# Patient Record
Sex: FEMALE | Race: WHITE | NOT HISPANIC OR LATINO | Employment: FULL TIME | URBAN - METROPOLITAN AREA
[De-identification: names, ages, dates, MRNs, and addresses within clinical notes are randomized per-mention and may not be internally consistent; named-entity substitution may affect disease eponyms.]

---

## 2020-09-17 ENCOUNTER — IMMUNIZATION (OUTPATIENT)
Dept: SOCIAL WORK | Facility: CLINIC | Age: 35
End: 2020-09-17

## 2020-09-17 DIAGNOSIS — Z23 NEED FOR VACCINATION: ICD-10-CM

## 2020-09-17 PROCEDURE — 90686 IIV4 VACC NO PRSV 0.5 ML IM: CPT | Performed by: REGISTERED NURSE

## 2020-11-02 ENCOUNTER — APPOINTMENT (OUTPATIENT)
Dept: URGENT CARE | Facility: CLINIC | Age: 35
End: 2020-11-02

## 2020-12-30 ENCOUNTER — TELEPHONE (OUTPATIENT)
Dept: SCHEDULING | Facility: IMAGING CENTER | Age: 35
End: 2020-12-30

## 2020-12-30 ENCOUNTER — OFFICE VISIT (OUTPATIENT)
Dept: URGENT CARE | Facility: CLINIC | Age: 35
End: 2020-12-30
Payer: COMMERCIAL

## 2020-12-30 VITALS
OXYGEN SATURATION: 97 % | HEIGHT: 62 IN | HEART RATE: 79 BPM | DIASTOLIC BLOOD PRESSURE: 114 MMHG | TEMPERATURE: 96.8 F | SYSTOLIC BLOOD PRESSURE: 172 MMHG | WEIGHT: 182 LBS | BODY MASS INDEX: 33.49 KG/M2

## 2020-12-30 DIAGNOSIS — R42 DIZZY: ICD-10-CM

## 2020-12-30 DIAGNOSIS — I10 HYPERTENSION, UNSPECIFIED TYPE: ICD-10-CM

## 2020-12-30 PROCEDURE — 99204 OFFICE O/P NEW MOD 45 MIN: CPT | Performed by: NURSE PRACTITIONER

## 2020-12-30 RX ORDER — LISINOPRIL 5 MG/1
5 TABLET ORAL DAILY
Qty: 30 TAB | Refills: 0 | Status: SHIPPED | OUTPATIENT
Start: 2020-12-30 | End: 2021-01-11 | Stop reason: SDUPTHER

## 2020-12-30 ASSESSMENT — ENCOUNTER SYMPTOMS
HEADACHES: 1
DIZZINESS: 1
VOMITING: 0

## 2020-12-30 NOTE — PROGRESS NOTES
"  Subjective:     Martha Hobbs is a 35 y.o. female who presents for Blood Pressure Problem (headaches, lightheadedness, dizziness, feels like she is about to faint, q7gsium, has had this in the past)      Lightheaded, with walking. Pounding headache, intermittent. Bilateral pain right behind eyes. Blurred vision with headache. Nausea. No syncope. No SOB or CP. Started at [id number]. Pain greater behind right eye. BP ranges from 150-160 at home. Seen by PCP 2 years ago and told she should start HTN medications. Did not want to go on medications at that time. Had similar symptoms at the time, not as bad. Now has new insurance. Has a PCP her family uses. Does not have an upcoming PCP appt, needs to make an appointment. Hx of migraines in teens, \"not in a long time\".          Dizziness  This is a new problem. The current episode started 1 to 4 weeks ago. The problem occurs intermittently. The problem has been waxing and waning. Associated symptoms include headaches. Pertinent negatives include no chest pain, fever, sore throat, visual change, vomiting or weakness. Nothing aggravates the symptoms. She has tried nothing for the symptoms.       Past Medical History:   Diagnosis Date   • Psychiatric disorder        History reviewed. No pertinent surgical history.    Social History     Socioeconomic History   • Marital status: Single     Spouse name: Not on file   • Number of children: Not on file   • Years of education: Not on file   • Highest education level: Not on file   Occupational History   • Not on file   Social Needs   • Financial resource strain: Not on file   • Food insecurity     Worry: Not on file     Inability: Not on file   • Transportation needs     Medical: Not on file     Non-medical: Not on file   Tobacco Use   • Smoking status: Current Every Day Smoker   Substance and Sexual Activity   • Alcohol use: No   • Drug use: No   • Sexual activity: Not on file   Lifestyle   • Physical activity     Days per week: " "Not on file     Minutes per session: Not on file   • Stress: Not on file   Relationships   • Social connections     Talks on phone: Not on file     Gets together: Not on file     Attends Gnosticist service: Not on file     Active member of club or organization: Not on file     Attends meetings of clubs or organizations: Not on file     Relationship status: Not on file   • Intimate partner violence     Fear of current or ex partner: Not on file     Emotionally abused: Not on file     Physically abused: Not on file     Forced sexual activity: Not on file   Other Topics Concern   • Not on file   Social History Narrative   • Not on file        History reviewed. No pertinent family history.     No Known Allergies    Review of Systems   Constitutional: Negative for fever.   HENT: Negative for ear pain, hearing loss, sore throat and tinnitus.    Eyes: Negative for double vision.   Respiratory: Negative for shortness of breath.    Cardiovascular: Negative for chest pain and leg swelling.   Gastrointestinal: Negative for vomiting.   Neurological: Positive for dizziness and headaches. Negative for sensory change, speech change, focal weakness and weakness.   All other systems reviewed and are negative.       Objective:   BP (!) 172/114   Pulse 79   Temp 36 °C (96.8 °F) (Temporal)   Ht 1.575 m (5' 2\")   Wt 82.6 kg (182 lb)   SpO2 97%   BMI 33.29 kg/m²     Physical Exam  Vitals signs reviewed.   Constitutional:       General: She is not in acute distress.     Appearance: She is well-developed. She is not toxic-appearing.   HENT:      Head: Normocephalic and atraumatic.      Right Ear: Tympanic membrane, ear canal and external ear normal.      Left Ear: Tympanic membrane, ear canal and external ear normal.      Nose: Nose normal.      Mouth/Throat:      Mouth: Mucous membranes are moist.   Eyes:      General: Lids are normal.      Extraocular Movements: Extraocular movements intact.      Conjunctiva/sclera: Conjunctivae " normal.      Pupils: Pupils are equal, round, and reactive to light.      Funduscopic exam:     Right eye: No papilledema.         Left eye: No papilledema.      Comments: Clear retinal margins.    Neck:      Musculoskeletal: Normal range of motion and neck supple. No neck rigidity.   Cardiovascular:      Rate and Rhythm: Normal rate and regular rhythm.      Comments: No ataxia. No papiledema.  Pulmonary:      Effort: Pulmonary effort is normal. No respiratory distress.      Breath sounds: Normal breath sounds. No stridor. No wheezing, rhonchi or rales.   Musculoskeletal: Normal range of motion.   Skin:     General: Skin is warm and dry.      Findings: No rash.   Neurological:      General: No focal deficit present.      Mental Status: She is alert and oriented to person, place, and time.      GCS: GCS eye subscore is 4. GCS verbal subscore is 5. GCS motor subscore is 6.      Cranial Nerves: No facial asymmetry.      Motor: Motor function is intact.      Coordination: Coordination is intact. Romberg sign negative. Heel to Real Test normal.      Gait: Gait is intact. Gait normal.      Comments: Clear speech. No facial droop.    Psychiatric:         Mood and Affect: Mood normal.         Speech: Speech normal.         Behavior: Behavior normal.         Thought Content: Thought content normal.         Judgment: Judgment normal.         Assessment/Plan:   1. Hypertension, unspecified type  - EKG - Clinic Performed  - Comp Metabolic Panel; Future  - TSH; Future  - CBC WITH DIFFERENTIAL; Future  - LIPID PANEL  - lisinopril (PRINIVIL) 5 MG Tab; Take 1 Tab by mouth every day for 30 days.  Dispense: 30 Tab; Refill: 0    2. Dizzy  - EKG - Clinic Performed  -NSR. No ST elevation.     -Lifestyle modifications: Low sodium diet and exercise.  -Blood pressure log.   -Tylenol or NSAIDs for headache.  -Oral hydration.  -ER precautions: worsening symptoms, return of vision changes, severe or persistent  headache, loss of balance,  increased or worsening dizziness, syncope, chest pain, SOB, weakness, neuro changes.    Follow up with PCP. Follow up for new or persistent symptoms, or for any other concerns. Hx of tubal ligation, no intent on pregnancy.Discussed initiating labs, ER precautions, and PCP follow up. Possible need to titrate or change hypertension medication. No neuro deficits. Discussed risk for vascular and organ injury with uncontrolled hypertension.    Differential diagnosis, natural history, supportive care, and indications for immediate follow-up discussed.

## 2021-01-03 ASSESSMENT — ENCOUNTER SYMPTOMS
SHORTNESS OF BREATH: 0
SORE THROAT: 0
FOCAL WEAKNESS: 0
WEAKNESS: 0
SENSORY CHANGE: 0
DOUBLE VISION: 0
FEVER: 0
VISUAL CHANGE: 0
SPEECH CHANGE: 0

## 2021-01-09 ENCOUNTER — HOSPITAL ENCOUNTER (OUTPATIENT)
Dept: LAB | Facility: MEDICAL CENTER | Age: 36
End: 2021-01-09
Attending: NURSE PRACTITIONER
Payer: COMMERCIAL

## 2021-01-09 DIAGNOSIS — I10 HYPERTENSION, UNSPECIFIED TYPE: ICD-10-CM

## 2021-01-09 LAB
ALBUMIN SERPL BCP-MCNC: 4.8 G/DL (ref 3.2–4.9)
ALBUMIN/GLOB SERPL: 1.7 G/DL
ALP SERPL-CCNC: 64 U/L (ref 30–99)
ALT SERPL-CCNC: 18 U/L (ref 2–50)
ANION GAP SERPL CALC-SCNC: 9 MMOL/L (ref 7–16)
AST SERPL-CCNC: 20 U/L (ref 12–45)
BASOPHILS # BLD AUTO: 0.6 % (ref 0–1.8)
BASOPHILS # BLD: 0.04 K/UL (ref 0–0.12)
BILIRUB SERPL-MCNC: 0.4 MG/DL (ref 0.1–1.5)
BUN SERPL-MCNC: 13 MG/DL (ref 8–22)
CALCIUM SERPL-MCNC: 9.7 MG/DL (ref 8.5–10.5)
CHLORIDE SERPL-SCNC: 102 MMOL/L (ref 96–112)
CHOLEST SERPL-MCNC: 213 MG/DL (ref 100–199)
CO2 SERPL-SCNC: 24 MMOL/L (ref 20–33)
CREAT SERPL-MCNC: 0.55 MG/DL (ref 0.5–1.4)
EOSINOPHIL # BLD AUTO: 0.1 K/UL (ref 0–0.51)
EOSINOPHIL NFR BLD: 1.4 % (ref 0–6.9)
ERYTHROCYTE [DISTWIDTH] IN BLOOD BY AUTOMATED COUNT: 51.5 FL (ref 35.9–50)
FASTING STATUS PATIENT QL REPORTED: NORMAL
GLOBULIN SER CALC-MCNC: 2.9 G/DL (ref 1.9–3.5)
GLUCOSE SERPL-MCNC: 94 MG/DL (ref 65–99)
HCT VFR BLD AUTO: 47.1 % (ref 37–47)
HDLC SERPL-MCNC: 70 MG/DL
HGB BLD-MCNC: 15.8 G/DL (ref 12–16)
IMM GRANULOCYTES # BLD AUTO: 0.01 K/UL (ref 0–0.11)
IMM GRANULOCYTES NFR BLD AUTO: 0.1 % (ref 0–0.9)
LDLC SERPL CALC-MCNC: 107 MG/DL
LYMPHOCYTES # BLD AUTO: 1.65 K/UL (ref 1–4.8)
LYMPHOCYTES NFR BLD: 23.7 % (ref 22–41)
MCH RBC QN AUTO: 34.9 PG (ref 27–33)
MCHC RBC AUTO-ENTMCNC: 33.5 G/DL (ref 33.6–35)
MCV RBC AUTO: 104 FL (ref 81.4–97.8)
MONOCYTES # BLD AUTO: 0.55 K/UL (ref 0–0.85)
MONOCYTES NFR BLD AUTO: 7.9 % (ref 0–13.4)
NEUTROPHILS # BLD AUTO: 4.62 K/UL (ref 2–7.15)
NEUTROPHILS NFR BLD: 66.3 % (ref 44–72)
NRBC # BLD AUTO: 0 K/UL
NRBC BLD-RTO: 0 /100 WBC
PLATELET # BLD AUTO: 197 K/UL (ref 164–446)
PMV BLD AUTO: 10 FL (ref 9–12.9)
POTASSIUM SERPL-SCNC: 4.2 MMOL/L (ref 3.6–5.5)
PROT SERPL-MCNC: 7.7 G/DL (ref 6–8.2)
RBC # BLD AUTO: 4.53 M/UL (ref 4.2–5.4)
SODIUM SERPL-SCNC: 135 MMOL/L (ref 135–145)
TRIGL SERPL-MCNC: 179 MG/DL (ref 0–149)
TSH SERPL DL<=0.005 MIU/L-ACNC: 1.57 UIU/ML (ref 0.38–5.33)
WBC # BLD AUTO: 7 K/UL (ref 4.8–10.8)

## 2021-01-09 PROCEDURE — 36415 COLL VENOUS BLD VENIPUNCTURE: CPT

## 2021-01-09 PROCEDURE — 80053 COMPREHEN METABOLIC PANEL: CPT

## 2021-01-09 PROCEDURE — 80061 LIPID PANEL: CPT

## 2021-01-09 PROCEDURE — 85025 COMPLETE CBC W/AUTO DIFF WBC: CPT

## 2021-01-09 PROCEDURE — 84443 ASSAY THYROID STIM HORMONE: CPT

## 2021-01-11 ENCOUNTER — OFFICE VISIT (OUTPATIENT)
Dept: MEDICAL GROUP | Facility: MEDICAL CENTER | Age: 36
End: 2021-01-11
Payer: COMMERCIAL

## 2021-01-11 VITALS
BODY MASS INDEX: 34.41 KG/M2 | OXYGEN SATURATION: 98 % | HEART RATE: 66 BPM | SYSTOLIC BLOOD PRESSURE: 150 MMHG | DIASTOLIC BLOOD PRESSURE: 100 MMHG | HEIGHT: 62 IN | TEMPERATURE: 97.6 F | WEIGHT: 187 LBS

## 2021-01-11 DIAGNOSIS — R71.8 ELEVATED MCV: ICD-10-CM

## 2021-01-11 DIAGNOSIS — I10 HYPERTENSION, UNSPECIFIED TYPE: ICD-10-CM

## 2021-01-11 DIAGNOSIS — E66.09 CLASS 1 OBESITY DUE TO EXCESS CALORIES WITHOUT SERIOUS COMORBIDITY WITH BODY MASS INDEX (BMI) OF 34.0 TO 34.9 IN ADULT: ICD-10-CM

## 2021-01-11 DIAGNOSIS — I10 ESSENTIAL HYPERTENSION: ICD-10-CM

## 2021-01-11 DIAGNOSIS — F31.9 BIPOLAR DEPRESSION (HCC): ICD-10-CM

## 2021-01-11 PROBLEM — E66.811 CLASS 1 OBESITY DUE TO EXCESS CALORIES WITHOUT SERIOUS COMORBIDITY WITH BODY MASS INDEX (BMI) OF 34.0 TO 34.9 IN ADULT: Status: ACTIVE | Noted: 2021-01-11

## 2021-01-11 PROCEDURE — 99214 OFFICE O/P EST MOD 30 MIN: CPT | Performed by: FAMILY MEDICINE

## 2021-01-11 RX ORDER — LISINOPRIL 20 MG/1
20 TABLET ORAL DAILY
Qty: 30 TAB | Refills: 0 | Status: SHIPPED | OUTPATIENT
Start: 2021-01-11 | End: 2021-02-04

## 2021-01-11 ASSESSMENT — PATIENT HEALTH QUESTIONNAIRE - PHQ9: CLINICAL INTERPRETATION OF PHQ2 SCORE: 0

## 2021-01-11 ASSESSMENT — FIBROSIS 4 INDEX: FIB4 SCORE: 0.84

## 2021-01-12 NOTE — ASSESSMENT & PLAN NOTE
New to discuss.   For many years, had BP around 150 systolic  Recently seen in UC, BP severely elevated, patient symptomatic.   Now on lisinoril 5mg., BP improved. Still elevated.   Symptoms improved significantly but still with headaches and dizziness.

## 2021-01-12 NOTE — PROGRESS NOTES
Subjective:     CC: Diagnoses of Hypertension, unspecified type, Class 1 obesity due to excess calories without serious comorbidity with body mass index (BMI) of 34.0 to 34.9 in adult, Essential hypertension, Bipolar depression (HCC), and Elevated MCV were pertinent to this visit.    HPI: Patient is a 35 y.o. female new patient who presents today to establish care.      Essential hypertension  New to discuss.   Patient states that she has been told she had mildly elevated blood pressure for many years, had BP around 150 systolic for years.  Recently seen in , BP severely elevated, patient symptomatic.   Now on lisinoril 5mg, BP improved. Still elevated.   Symptoms improved significantly but still with headaches and dizziness.     Class 1 obesity due to excess calories without serious comorbidity with body mass index (BMI) of 34.0 to 34.9 in adult  Works quite a bit  Grab and go foods frequently.  Has been increasing wt for the past 2 yrs.     Elevated MCV  New problem.  MCV elevated at 104.  No anemia.  Patient denies significant alcohol intake.     Bipolar depression (HCC)  Chronic problem, diagnosed years ago.  Was well controlled on medications in the past.  Now off of medications, needs psychiatrist here in [place].      Past Medical History:   Diagnosis Date   • Essential hypertension 1/11/2021   • Psychiatric disorder        Social History     Tobacco Use   • Smoking status: Current Every Day Smoker     Types: Cigarettes   • Smokeless tobacco: Never Used   Substance Use Topics   • Alcohol use: No   • Drug use: No       Current Outpatient Medications Ordered in Epic   Medication Sig Dispense Refill   • lisinopril (PRINIVIL) 20 MG Tab Take 1 Tab by mouth every day for 30 days. 30 Tab 0     No current Epic-ordered facility-administered medications on file.        Allergies:  Patient has no known allergies.    Health Maintenance: Completed    ROS:  Gen: no fevers/chill, no changes in weight  Eyes: no changes in  "vision  ENT: no sore throat, no hearing loss, no bloody nose  Pulm: no sob, no cough  CV: no chest pain, no palpitations  GI: no nausea/vomiting, no diarrhea  : no dysuria  MSk: no myalgias  Skin: no rash  Neuro: no headaches, no numbness/tingling  Heme/Lymph: no easy bruising      Objective:       Exam:  /100 (BP Location: Left arm, Patient Position: Sitting, BP Cuff Size: Adult long)   Pulse 66   Temp 36.4 °C (97.6 °F) (Temporal)   Ht 1.575 m (5' 2\")   Wt 84.8 kg (187 lb)   LMP 12/23/2020   SpO2 98%   BMI 34.20 kg/m²  Body mass index is 34.2 kg/m².      General: Normal appearing. No distress.  HEAD: NCAT  EYES: conjunctiva clear, lids without ptosis, pupils equal  and reactive to light  EARS: ears normal shape and contour, canals are clear bilaterally, TMs clear  MOUTH: Mask in place throughout exam.  Neck: Supple without masses. Thyroid is not enlarged. Normal ROM  Pulmonary: Clear to ausculation.  Normal effort. No rales, ronchi, or wheezing.  Cardiovascular: Regular rate and rhythm, no murmur. No LE edema  Neurologic: Grossly normal, no focal deficits  Lymph: No cervical or supraclavicular lymph nodes are palpable  Skin: Warm and dry.  No obvious lesions.  Musculoskeletal: Normal gait and station.   Psych: Normal mood and affect. Alert and oriented x3. Judgment and insight is normal.    Labs: 1/9/2021 results reviewed and discussed with the patient, questions answered.    Assessment & Plan:     35 y.o. female with the following -     1. Hypertension, unspecified type  New to discuss.  Recently diagnosed in urgent care although has had elevated blood pressures for many years.  Currently on lisinopril 5 mg, blood pressure 150/100.  Still mildly symptomatic.  Plan to increase lisinopril 20 mg.  Repeat BMP ordered.  Plan to follow-up in 1 month.  - lisinopril (PRINIVIL) 20 MG Tab; Take 1 Tab by mouth every day for 30 days.  Dispense: 30 Tab; Refill: 0    2. Class 1 obesity due to excess calories " without serious comorbidity with body mass index (BMI) of 34.0 to 34.9 in adult  Chronic problem, uncontrolled.  Reviewed diet and exercise briefly today.  Plan to follow-up in 1 month with food diary.  - Basic Metabolic Panel; Future    3. Essential hypertension  - Basic Metabolic Panel; Future    4. Bipolar depression (HCC)  Chronic problem, diagnosed years ago.  Was well controlled on medications in the past.  Now off of medications, needs psychiatrist here in [place].  Referral placed.  - REFERRAL TO PSYCHIATRY    5. Elevated MCV  New problem.  MCV elevated at 104.  No anemia.  Patient denies significant alcohol intake.  Repeat CBC ordered.  - CBC WITH DIFFERENTIAL; Future      Return in about 4 weeks (around 2/8/2021).    Please note that this dictation was created using voice recognition software. I have made every reasonable attempt to correct obvious errors, but I expect that there are errors of grammar and possibly content that I did not discover before finalizing the note.

## 2021-01-13 ENCOUNTER — HOSPITAL ENCOUNTER (OUTPATIENT)
Facility: MEDICAL CENTER | Age: 36
End: 2021-01-13
Attending: NURSE PRACTITIONER
Payer: COMMERCIAL

## 2021-01-13 ENCOUNTER — OFFICE VISIT (OUTPATIENT)
Dept: URGENT CARE | Facility: CLINIC | Age: 36
End: 2021-01-13
Payer: COMMERCIAL

## 2021-01-13 VITALS
HEART RATE: 88 BPM | BODY MASS INDEX: 33.66 KG/M2 | HEIGHT: 63 IN | DIASTOLIC BLOOD PRESSURE: 82 MMHG | SYSTOLIC BLOOD PRESSURE: 128 MMHG | RESPIRATION RATE: 16 BRPM | TEMPERATURE: 97.6 F | WEIGHT: 190 LBS | OXYGEN SATURATION: 98 %

## 2021-01-13 DIAGNOSIS — R11.0 NAUSEA: ICD-10-CM

## 2021-01-13 DIAGNOSIS — B34.9 VIRAL ILLNESS: ICD-10-CM

## 2021-01-13 PROCEDURE — U0003 INFECTIOUS AGENT DETECTION BY NUCLEIC ACID (DNA OR RNA); SEVERE ACUTE RESPIRATORY SYNDROME CORONAVIRUS 2 (SARS-COV-2) (CORONAVIRUS DISEASE [COVID-19]), AMPLIFIED PROBE TECHNIQUE, MAKING USE OF HIGH THROUGHPUT TECHNOLOGIES AS DESCRIBED BY CMS-2020-01-R: HCPCS

## 2021-01-13 PROCEDURE — U0005 INFEC AGEN DETEC AMPLI PROBE: HCPCS

## 2021-01-13 PROCEDURE — 99214 OFFICE O/P EST MOD 30 MIN: CPT | Performed by: NURSE PRACTITIONER

## 2021-01-13 RX ORDER — ONDANSETRON 4 MG/1
4 TABLET, FILM COATED ORAL EVERY 6 HOURS PRN
Qty: 15 TAB | Refills: 0 | Status: SHIPPED | OUTPATIENT
Start: 2021-01-13 | End: 2021-02-11

## 2021-01-13 SDOH — HEALTH STABILITY: MENTAL HEALTH: HOW OFTEN DO YOU HAVE A DRINK CONTAINING ALCOHOL?: MONTHLY OR LESS

## 2021-01-13 SDOH — HEALTH STABILITY: MENTAL HEALTH: HOW MANY STANDARD DRINKS CONTAINING ALCOHOL DO YOU HAVE ON A TYPICAL DAY?: 1 OR 2

## 2021-01-13 ASSESSMENT — FIBROSIS 4 INDEX: FIB4 SCORE: 0.84

## 2021-01-14 DIAGNOSIS — B34.9 VIRAL ILLNESS: ICD-10-CM

## 2021-01-14 DIAGNOSIS — R11.0 NAUSEA: ICD-10-CM

## 2021-01-14 LAB
COVID ORDER STATUS COVID19: NORMAL
SARS-COV-2 RNA RESP QL NAA+PROBE: NOTDETECTED
SPECIMEN SOURCE: NORMAL

## 2021-01-14 NOTE — PROGRESS NOTES
Chief Complaint   Patient presents with   • Vomiting     x last night,    • Fatigue       HISTORY OF PRESENT ILLNESS: Patient is a 35 y.o. female who presents today due to symptoms which started last night.  She suddenly developed nausea with vomiting last night.  Since then she has also experience shortness of breath, fatigue, malaise, chills.  She denies any abdominal pain, fever, cough, runny nose, sore throat, urinary symptoms or chest pain.  Denies any known ill contacts.  No immunocompromise. No recent ABX use. No other aggravating or alleviating factors. Reports no known exposure to COVID-19 but is concerned about such today.       Patient Active Problem List    Diagnosis Date Noted   • Class 1 obesity due to excess calories without serious comorbidity with body mass index (BMI) of 34.0 to 34.9 in adult 01/11/2021   • Essential hypertension 01/11/2021   • Bipolar depression (HCC) 01/11/2021       Allergies:Patient has no known allergies.    Current Outpatient Medications Ordered in Epic   Medication Sig Dispense Refill   • lisinopril (PRINIVIL) 20 MG Tab Take 1 Tab by mouth every day for 30 days. 30 Tab 0     No current Epic-ordered facility-administered medications on file.        Past Medical History:   Diagnosis Date   • Essential hypertension 1/11/2021   • Psychiatric disorder        Social History     Tobacco Use   • Smoking status: Current Every Day Smoker     Types: Cigarettes   • Smokeless tobacco: Never Used   Substance Use Topics   • Alcohol use: Yes     Frequency: Monthly or less     Drinks per session: 1 or 2     Comment: social    • Drug use: No       Family Status   Relation Name Status   • Mo  Alive   • Fa  Alive     Family History   Problem Relation Age of Onset   • Heart Disease Mother        ROS:  Review of Systems   Constitutional: Positive for chills, fatigue, malaise. Negative for fever, weight loss.  HENT: Negative for rhinitis, sore throat. Negative for ear pain, nosebleeds, and neck  "pain.    Eyes: Negative for vision changes.   Cardiovascular: Negative for chest pain, palpitations, orthopnea and leg swelling.   Respiratory: Positive for shortness of breath.  Negative for cough, wheezing.  Gastrointestinal: Positive for nausea, vomiting. Negative for abdominal pain, diarrhea.   Skin: Negative for rash, diaphoresis.     Exam:  /82   Pulse 88   Temp 36.4 °C (97.6 °F) (Temporal)   Resp 16   Ht 1.6 m (5' 3\")   Wt 86.2 kg (190 lb)   SpO2 98%   General: well-nourished, well-developed female in NAD  Head: normocephalic, atraumatic  Eyes: PERRLA, EOM within normal limits, no conjunctival injection, no scleral icterus, visual fields and acuity grossly intact.  Ears: normal shape and symmetry, no tenderness, no discharge. External canals are without any significant edema or erythema. Tympanic membranes are without any inflammation, no effusion. Gross auditory acuity is intact.  Nose: symmetrical without tenderness, mild discharge, erythema present bilateral nares.  Mouth/Throat: reasonable hygiene, no exudates or tonsillar enlargement. Mild erythema present.   Neck: no masses, range of motion within normal limits, no tracheal deviation.  Lymph: mild cervical adenopathy. No supraclavicular adenopathy.   Neuro: alert and oriented. Cranial nerves 1-12 grossly intact.   Cardiovascular: regular rate and rhythm without murmurs, rubs, or gallops. No edema.   Pulmonary: no distress. Chest is symmetrical with respiration. No wheezes, crackles, or rhonchi.   Musculoskeletal: appropriate muscle tone, gait is stable.  GI: soft, non-tender, no guarding, no hepatosplenomegaly.   Skin: warm, dry, intact, no clubbing, no cyanosis.   Psych: appropriate mood, affect, judgement.         Assessment/Plan:  1. Viral illness  COVID/SARS CoV-2 PCR   2. Nausea  COVID/SARS CoV-2 PCR    ondansetron (ZOFRAN) 4 MG Tab tablet             Discussed symptoms most likely viral, will test for COVID. Home quarantine advised. " Discussed natural progression and sx care. Rest, increase fluids, hand and respiratory hygiene. May take OTC medications as directed for symptom relief.  Zofran as needed.  STRICT ER precautions.   Supportive care, differential diagnoses, and indications for immediate follow-up discussed with patient.   Pathogenesis of diagnosis discussed including typical length and natural progression.  Instructed to return to clinic or nearest emergency department for any change in condition, further concerns, or worsening of symptoms.  Patient states understanding of the plan of care and discharge instructions.          PATRICIA Kimble.

## 2021-02-03 DIAGNOSIS — I10 HYPERTENSION, UNSPECIFIED TYPE: ICD-10-CM

## 2021-02-04 RX ORDER — LISINOPRIL 20 MG/1
TABLET ORAL
Qty: 30 TAB | Refills: 0 | Status: SHIPPED | OUTPATIENT
Start: 2021-02-04 | End: 2021-02-11 | Stop reason: SDUPTHER

## 2021-02-11 ENCOUNTER — OFFICE VISIT (OUTPATIENT)
Dept: MEDICAL GROUP | Facility: MEDICAL CENTER | Age: 36
End: 2021-02-11
Payer: COMMERCIAL

## 2021-02-11 VITALS
DIASTOLIC BLOOD PRESSURE: 76 MMHG | TEMPERATURE: 97 F | HEART RATE: 70 BPM | WEIGHT: 188 LBS | OXYGEN SATURATION: 96 % | SYSTOLIC BLOOD PRESSURE: 118 MMHG | BODY MASS INDEX: 33.31 KG/M2 | HEIGHT: 63 IN

## 2021-02-11 DIAGNOSIS — I10 HYPERTENSION, UNSPECIFIED TYPE: ICD-10-CM

## 2021-02-11 DIAGNOSIS — I10 ESSENTIAL HYPERTENSION: ICD-10-CM

## 2021-02-11 PROCEDURE — 99213 OFFICE O/P EST LOW 20 MIN: CPT | Performed by: FAMILY MEDICINE

## 2021-02-11 RX ORDER — LISINOPRIL 20 MG/1
20 TABLET ORAL
Qty: 90 TABLET | Refills: 3 | Status: SHIPPED | OUTPATIENT
Start: 2021-02-11 | End: 2021-09-30 | Stop reason: SDUPTHER

## 2021-02-11 ASSESSMENT — FIBROSIS 4 INDEX: FIB4 SCORE: 0.84

## 2021-02-12 NOTE — ASSESSMENT & PLAN NOTE
Chronic problem. Notes improvement on 20mg of lisniopril. Feels good on the meidcine. BP improved.

## 2021-02-12 NOTE — PROGRESS NOTES
"Subjective:     CC: Diagnoses of Essential hypertension and Hypertension, unspecified type were pertinent to this visit.    HPI: Patient is a 35 y.o. female established patient who presents today to follow-up on hypertension.      Essential hypertension  Chronic problem. Notes improvement on 20mg of lisniopril. Feels good on the meidcine.  No further headaches or pounding in the ears.  BP improved.  Denies any side effects of the medication.  The patient did not get her BMP repeat.  Also did not get CBC, MCV was elevated.    Past Medical History:   Diagnosis Date   • Essential hypertension 1/11/2021   • Psychiatric disorder        Social History     Tobacco Use   • Smoking status: Current Every Day Smoker     Types: Cigarettes   • Smokeless tobacco: Never Used   Substance Use Topics   • Alcohol use: Yes     Comment: social    • Drug use: No       Current Outpatient Medications Ordered in Epic   Medication Sig Dispense Refill   • lisinopril (PRINIVIL) 20 MG Tab Take 1 tablet by mouth every day. 90 tablet 3     No current Epic-ordered facility-administered medications on file.       Allergies:  Patient has no known allergies.    Health Maintenance: Completed      Objective:       Exam:  /76 (BP Location: Left arm, Patient Position: Sitting, BP Cuff Size: Adult long)   Pulse 70   Temp 36.1 °C (97 °F) (Temporal)   Ht 1.6 m (5' 3\")   Wt 85.3 kg (188 lb)   LMP 01/21/2021   SpO2 96%   BMI 33.30 kg/m²  Body mass index is 33.3 kg/m².    General: Normal appearing. No distress.  HEAD: NCAT  EYES: conjunctiva clear, lids without ptosis, pupils equal and reactive to light  EARS: ears normal shape and contour.  MOUTH: normal dentition   Neck:  Normal ROM  Pulmonary: Normal effort. Normal respiratory rate.  Cardiovascular: Well perfused. No LE edema  Neurologic: Grossly normal, no focal deficits  Skin: Warm and dry.  No obvious lesions.  Musculoskeletal: Normal gait and station.   Psych: Normal mood and affect. Alert " and oriented x3. Judgment and insight is normal.      Assessment & Plan:     35 y.o. female with the following -     1. Essential hypertension  Chronic problem, improvement noted with lisinopril 20 mg.  Denies any side effects of the medication.  Encourage patient to get BMP done.  Also due for CBC as MCV was elevated.      Return in about 3 months (around 5/11/2021).    Please note that this dictation was created using voice recognition software. I have made every reasonable attempt to correct obvious errors, but I expect that there are errors of grammar and possibly content that I did not discover before finalizing the note.

## 2021-08-09 ENCOUNTER — HOSPITAL ENCOUNTER (OUTPATIENT)
Facility: MEDICAL CENTER | Age: 36
End: 2021-08-09
Attending: PHYSICIAN ASSISTANT
Payer: COMMERCIAL

## 2021-08-09 ENCOUNTER — OFFICE VISIT (OUTPATIENT)
Dept: URGENT CARE | Facility: PHYSICIAN GROUP | Age: 36
End: 2021-08-09
Payer: COMMERCIAL

## 2021-08-09 VITALS
DIASTOLIC BLOOD PRESSURE: 78 MMHG | WEIGHT: 175 LBS | TEMPERATURE: 98.6 F | SYSTOLIC BLOOD PRESSURE: 112 MMHG | OXYGEN SATURATION: 96 % | HEART RATE: 84 BPM | HEIGHT: 63 IN | RESPIRATION RATE: 12 BRPM | BODY MASS INDEX: 31.01 KG/M2

## 2021-08-09 DIAGNOSIS — Z20.822 EXPOSURE TO COVID-19 VIRUS: ICD-10-CM

## 2021-08-09 PROCEDURE — 99213 OFFICE O/P EST LOW 20 MIN: CPT | Mod: CS | Performed by: PHYSICIAN ASSISTANT

## 2021-08-09 PROCEDURE — U0003 INFECTIOUS AGENT DETECTION BY NUCLEIC ACID (DNA OR RNA); SEVERE ACUTE RESPIRATORY SYNDROME CORONAVIRUS 2 (SARS-COV-2) (CORONAVIRUS DISEASE [COVID-19]), AMPLIFIED PROBE TECHNIQUE, MAKING USE OF HIGH THROUGHPUT TECHNOLOGIES AS DESCRIBED BY CMS-2020-01-R: HCPCS

## 2021-08-09 PROCEDURE — U0005 INFEC AGEN DETEC AMPLI PROBE: HCPCS

## 2021-08-09 ASSESSMENT — ENCOUNTER SYMPTOMS
VOMITING: 0
SHORTNESS OF BREATH: 0
SORE THROAT: 0
PALPITATIONS: 0
NAUSEA: 0
SINUS PAIN: 1
CONSTIPATION: 0
ABDOMINAL PAIN: 0
CHILLS: 0
HEADACHES: 1
DIARRHEA: 0
FEVER: 0
WHEEZING: 0
COUGH: 0
DIZZINESS: 0
MYALGIAS: 1

## 2021-08-09 ASSESSMENT — FIBROSIS 4 INDEX: FIB4 SCORE: 0.86

## 2021-08-09 NOTE — PROGRESS NOTES
Subjective:   Martha Hobbs is a 36 y.o. female who presents for Sinus Pain (Headache, ear pain, congested) and Body Aches (body aches with fatigue, Req Covid Swab )      HPI  36 y.o. female presents to urgent care with new problem to provider of sinus pain, headache, congestion, body aches, and fatigue onset yesterday.  Patient is vaccinated against COVID.  Patient reports confirmed exposure to COVID-19 as her son recently tested positive.  She denies history of lung disease.  Denies other associated aggravating or alleviating factors.     Review of Systems   Constitutional: Positive for malaise/fatigue. Negative for chills and fever.   HENT: Positive for congestion and sinus pain. Negative for ear pain and sore throat.    Respiratory: Negative for cough, shortness of breath and wheezing.    Cardiovascular: Negative for chest pain and palpitations.   Gastrointestinal: Negative for abdominal pain, constipation, diarrhea, nausea and vomiting.   Musculoskeletal: Positive for myalgias.   Neurological: Positive for headaches. Negative for dizziness.       Patient Active Problem List   Diagnosis   • Class 1 obesity due to excess calories without serious comorbidity with body mass index (BMI) of 34.0 to 34.9 in adult   • Essential hypertension   • Bipolar depression (HCC)     No past surgical history on file.  Social History     Tobacco Use   • Smoking status: Current Every Day Smoker     Packs/day: 0.25     Types: Cigarettes   • Smokeless tobacco: Never Used   Vaping Use   • Vaping Use: Never used   Substance Use Topics   • Alcohol use: Yes     Comment: social    • Drug use: No      Family History   Problem Relation Age of Onset   • Heart Disease Mother       (Allergies, Medications, & Tobacco/Substance Use were reconciled by the Medical Assistant and reviewed by myself. The family history is prepopulated)     Objective:     /78 (BP Location: Right arm, Patient Position: Sitting, BP Cuff Size: Adult)   Pulse 84   " Temp 37 °C (98.6 °F) (Temporal)   Resp 12   Ht 1.6 m (5' 3\")   Wt 79.4 kg (175 lb)   SpO2 96%   BMI 31.00 kg/m²     Physical Exam  Vitals reviewed.   Constitutional:       General: She is not in acute distress.     Appearance: Normal appearance. She is well-developed. She is not ill-appearing.   HENT:      Head: Normocephalic and atraumatic.      Nose: Congestion present.      Mouth/Throat:      Mouth: Mucous membranes are moist.      Pharynx: Posterior oropharyngeal erythema present. No oropharyngeal exudate.   Eyes:      Conjunctiva/sclera: Conjunctivae normal.   Cardiovascular:      Rate and Rhythm: Normal rate and regular rhythm.      Heart sounds: Normal heart sounds.   Pulmonary:      Effort: Pulmonary effort is normal. No respiratory distress.      Breath sounds: Normal breath sounds.   Abdominal:      Palpations: Abdomen is soft.   Musculoskeletal:      Cervical back: Normal range of motion and neck supple.   Skin:     General: Skin is warm and dry.   Neurological:      Mental Status: She is alert and oriented to person, place, and time.   Psychiatric:         Mood and Affect: Mood normal.         Behavior: Behavior normal.         Thought Content: Thought content normal.         Judgment: Judgment normal.         Assessment/Plan:     1. Exposure to COVID-19 virus  COVID/SARS CoV-2 PCR     Patient instructed to self-isolate/quarantine per CDC guidelines.  I will follow-up pending COVID-19 testing. Discussed with patient may obtain hard copy of results on [employer].   Advised patient symptoms are most likely viral in etiology. Increased fluids and rest. Discussed use of OTC cough and cold medication and Tylenol/Motrin for symptomatic relief.  Return for reevaluation or proceed to ED if symptoms persist or worsen. Supportive care, differential diagnoses, and indications for immediate follow-up discussed with patient. Patient should to proceed to ED for development of symptoms including but not limited to " shortness of breath breath, difficulty breathing, or worsening symptoms not manageable at home.   Vital signs stable, patient in no acute respiratory distress.  COVID-19 discharge instructions and CDC guidelines provided to patient in AVS.      Differential diagnosis, natural history, supportive care, and indications for immediate follow-up discussed.    Advised the patient to follow-up with the primary care physician for recheck, reevaluation, and consideration of further management.  Patient verbalized understanding of treatment plan and has no further questions regarding care.   This patient is evaluated under [facility] isolation protocols in urgent care.  Out of an abundance of caution I am wearing a N95 mask, protective eye gear, gloves and gown through all interaction with patient.       I personally reviewed prior external notes and test results pertinent to today's visit.     Please note that this dictation was created using voice recognition software. I have made a reasonable attempt to correct obvious errors, but I expect that there are errors of grammar and possibly content that I did not discover before finalizing the note.    This note was electronically signed by Wendi Stephen PA-C

## 2021-08-09 NOTE — LETTER
August 9, 2021  Martha Hobbs     Your employee was seen in our clinic today.  A concern for COVID-19 has been identified and testing is in progress. We are asking you to excuse absences while following self-isolation protocol per Center for Disease Control (CDC) guidelines.  Your employee will be able to access test results through our electronic delivery system called [employer].     If the results of testing are positive, your employee should follow the CDC guidelines.  These are isolation for a minimum of 10 days and at least 24 hours have passed since your last fever without the use of fever-reducing medications and all other symptoms have improved.  The health department may contact you and provide further directions regarding self-isolation and return to work.     Negative result without close contact*:  If your employee was tested due to their symptoms without close contact to someone with COVID-19 and their test is negative: your employee should not return to work or regular activities until 24 hours after symptoms fully improve. (For example, if patient feels back to normal on Tuesday, should remain isolated through Wednesday).      Negative with close contact*:  If your employee was tested due to close contact to someone, they should self isolate for 10 days after their exposure.    Negative with positive household member  If your employee was due to a positive household member they should still quarantine for a period of 10 days.  The 10 day period begins once the household member is isolated. If unable to quarantine (for example mom from infant), the CDC advises an additional 10-day quarantine period from the COVID-19 household member.   In general, repeat testing is not necessary and not offered through our [facility].     This is the only note that will be provided from [facility] for this visit.  Your employee will require an appointment with a primary care provider if FMLA or disability  forms are required.  If you have any questions please do not hesitate to call me at the phone number listed below.    Sincerely,  ALEXX Cherry.-C.  [phone]

## 2021-08-10 DIAGNOSIS — Z20.822 EXPOSURE TO COVID-19 VIRUS: ICD-10-CM

## 2021-08-10 LAB
COVID ORDER STATUS COVID19: NORMAL
SARS-COV-2 RNA RESP QL NAA+PROBE: DETECTED
SPECIMEN SOURCE: ABNORMAL

## 2021-09-30 DIAGNOSIS — I10 HYPERTENSION, UNSPECIFIED TYPE: ICD-10-CM

## 2021-10-01 RX ORDER — LISINOPRIL 20 MG/1
20 TABLET ORAL
Qty: 90 TABLET | Refills: 0 | Status: SHIPPED | OUTPATIENT
Start: 2021-10-01 | End: 2022-03-24 | Stop reason: SDUPTHER

## 2021-11-05 NOTE — PATIENT INSTRUCTIONS
"Hypertension, Adult  High blood pressure (hypertension) is when the force of blood pumping through the arteries is too strong. The arteries are the blood vessels that carry blood from the heart throughout the body. Hypertension forces the heart to work harder to pump blood and may cause arteries to become narrow or stiff. Untreated or uncontrolled hypertension can cause a heart attack, heart failure, a stroke, kidney disease, and other problems.  A blood pressure reading consists of a higher number over a lower number. Ideally, your blood pressure should be below 120/80. The first (\"top\") number is called the systolic pressure. It is a measure of the pressure in your arteries as your heart beats. The second (\"bottom\") number is called the diastolic pressure. It is a measure of the pressure in your arteries as the heart relaxes.  What are the causes?  The exact cause of this condition is not known. There are some conditions that result in or are related to high blood pressure.  What increases the risk?  Some risk factors for high blood pressure are under your control. The following factors may make you more likely to develop this condition:  · Smoking.  · Having type 2 diabetes mellitus, high cholesterol, or both.  · Not getting enough exercise or physical activity.  · Being overweight.  · Having too much fat, sugar, calories, or salt (sodium) in your diet.  · Drinking too much alcohol.  Some risk factors for high blood pressure may be difficult or impossible to change. Some of these factors include:  · Having chronic kidney disease.  · Having a family history of high blood pressure.  · Age. Risk increases with age.  · Race. You may be at higher risk if you are .  · Gender. Men are at higher risk than women before age 45. After age 65, women are at higher risk than men.  · Having obstructive sleep apnea.  · Stress.  What are the signs or symptoms?  High blood pressure may not cause symptoms. Very high " Freestyle Dodie ready for  at pharmacy Tabatha Delgado RN on 11/5/2021 at 2:35 PM     blood pressure (hypertensive crisis) may cause:  · Headache.  · Anxiety.  · Shortness of breath.  · Nosebleed.  · Nausea and vomiting.  · Vision changes.  · Severe chest pain.  · Seizures.  How is this diagnosed?  This condition is diagnosed by measuring your blood pressure while you are seated, with your arm resting on a flat surface, your legs uncrossed, and your feet flat on the floor. The cuff of the blood pressure monitor will be placed directly against the skin of your upper arm at the level of your heart. It should be measured at least twice using the same arm. Certain conditions can cause a difference in blood pressure between your right and left arms.  Certain factors can cause blood pressure readings to be lower or higher than normal for a short period of time:  · When your blood pressure is higher when you are in a health care provider's office than when you are at home, this is called white coat hypertension. Most people with this condition do not need medicines.  · When your blood pressure is higher at home than when you are in a health care provider's office, this is called masked hypertension. Most people with this condition may need medicines to control blood pressure.  If you have a high blood pressure reading during one visit or you have normal blood pressure with other risk factors, you may be asked to:  · Return on a different day to have your blood pressure checked again.  · Monitor your blood pressure at home for 1 week or longer.  If you are diagnosed with hypertension, you may have other blood or imaging tests to help your health care provider understand your overall risk for other conditions.  How is this treated?  This condition is treated by making healthy lifestyle changes, such as eating healthy foods, exercising more, and reducing your alcohol intake. Your health care provider may prescribe medicine if lifestyle changes are not enough to get your blood pressure under control, and  if:  · Your systolic blood pressure is above 130.  · Your diastolic blood pressure is above 80.  Your personal target blood pressure may vary depending on your medical conditions, your age, and other factors.  Follow these instructions at home:  Eating and drinking    · Eat a diet that is high in fiber and potassium, and low in sodium, added sugar, and fat. An example eating plan is called the DASH (Dietary Approaches to Stop Hypertension) diet. To eat this way:  ? Eat plenty of fresh fruits and vegetables. Try to fill one half of your plate at each meal with fruits and vegetables.  ? Eat whole grains, such as whole-wheat pasta, brown rice, or whole-grain bread. Fill about one fourth of your plate with whole grains.  ? Eat or drink low-fat dairy products, such as skim milk or low-fat yogurt.  ? Avoid fatty cuts of meat, processed or cured meats, and poultry with skin. Fill about one fourth of your plate with lean proteins, such as fish, chicken without skin, beans, eggs, or tofu.  ? Avoid pre-made and processed foods. These tend to be higher in sodium, added sugar, and fat.  · Reduce your daily sodium intake. Most people with hypertension should eat less than 1,500 mg of sodium a day.  · Do not drink alcohol if:  ? Your health care provider tells you not to drink.  ? You are pregnant, may be pregnant, or are planning to become pregnant.  · If you drink alcohol:  ? Limit how much you use to:  § 0-1 drink a day for women.  § 0-2 drinks a day for men.  ? Be aware of how much alcohol is in your drink. In the [place] one drink equals one 12 oz bottle of beer (355 mL), one 5 oz glass of wine (148 mL), or one 1½ oz glass of hard liquor (44 mL).  Lifestyle    · Work with your health care provider to maintain a healthy body weight or to lose weight. Ask what an ideal weight is for you.  · Get at least 30 minutes of exercise most days of the week. Activities may include walking, swimming, or biking.  · Include exercise to  strengthen your muscles (resistance exercise), such as Pilates or lifting weights, as part of your weekly exercise routine. Try to do these types of exercises for 30 minutes at least 3 days a week.  · Do not use any products that contain nicotine or tobacco, such as cigarettes, e-cigarettes, and chewing tobacco. If you need help quitting, ask your health care provider.  · Monitor your blood pressure at home as told by your health care provider.  · Keep all follow-up visits as told by your health care provider. This is important.  Medicines  · Take over-the-counter and prescription medicines only as told by your health care provider. Follow directions carefully. Blood pressure medicines must be taken as prescribed.  · Do not skip doses of blood pressure medicine. Doing this puts you at risk for problems and can make the medicine less effective.  · Ask your health care provider about side effects or reactions to medicines that you should watch for.  Contact a health care provider if you:  · Think you are having a reaction to a medicine you are taking.  · Have headaches that keep coming back (recurring).  · Feel dizzy.  · Have swelling in your ankles.  · Have trouble with your vision.  Get help right away if you:  · Develop a severe headache or confusion.  · Have unusual weakness or numbness.  · Feel faint.  · Have severe pain in your chest or abdomen.  · Vomit repeatedly.  · Have trouble breathing.  Summary  · Hypertension is when the force of blood pumping through your arteries is too strong. If this condition is not controlled, it may put you at risk for serious complications.  · Your personal target blood pressure may vary depending on your medical conditions, your age, and other factors. For most people, a normal blood pressure is less than 120/80.  · Hypertension is treated with lifestyle changes, medicines, or a combination of both. Lifestyle changes include losing weight, eating a healthy, low-sodium diet,  exercising more, and limiting alcohol.  This information is not intended to replace advice given to you by your health care provider. Make sure you discuss any questions you have with your health care provider.  Document Released: 12/18/2006 Document Revised: 08/28/2019 Document Reviewed: 08/28/2019  [employer] Patient Education © 2020 [employer]      Dizziness  Dizziness is a common problem. It is a feeling of unsteadiness or light-headedness. You may feel like you are about to faint. Dizziness can lead to injury if you stumble or fall. Anyone can become dizzy, but dizziness is more common in older adults. This condition can be caused by a number of things, including medicines, dehydration, or illness.  Follow these instructions at home:  Eating and drinking  · Drink enough fluid to keep your urine clear or pale yellow. This helps to keep you from becoming dehydrated. Try to drink more clear fluids, such as water.  · Do not drink alcohol.  · Limit your caffeine intake if told to do so by your health care provider. Check ingredients and nutrition facts to see if a food or beverage contains caffeine.  · Limit your salt (sodium) intake if told to do so by your health care provider. Check ingredients and nutrition facts to see if a food or beverage contains sodium.  Activity  · Avoid making quick movements.  ? Rise slowly from chairs and steady yourself until you feel okay.  ? In the morning, first sit up on the side of the bed. When you feel okay, stand slowly while you hold onto something until you know that your balance is fine.  · If you need to  one place for a long time, move your legs often. Tighten and relax the muscles in your legs while you are standing.  · Do not drive or use heavy machinery if you feel dizzy.  · Avoid bending down if you feel dizzy. Place items in your home so that they are easy for you to reach without leaning over.  Lifestyle  · Do not use any products that contain nicotine or  tobacco, such as cigarettes and e-cigarettes. If you need help quitting, ask your health care provider.  · Try to reduce your stress level by using methods such as yoga or meditation. Talk with your health care provider if you need help to manage your stress.  General instructions  · Watch your dizziness for any changes.  · Take over-the-counter and prescription medicines only as told by your health care provider. Talk with your health care provider if you think that your dizziness is caused by a medicine that you are taking.  · Tell a friend or a family member that you are feeling dizzy. If he or she notices any changes in your behavior, have this person call your health care provider.  · Keep all follow-up visits as told by your health care provider. This is important.  Contact a health care provider if:  · Your dizziness does not go away.  · Your dizziness or light-headedness gets worse.  · You feel nauseous.  · You have reduced hearing.  · You have new symptoms.  · You are unsteady on your feet or you feel like the room is spinning.  Get help right away if:  · You vomit or have diarrhea and are unable to eat or drink anything.  · You have problems talking, walking, swallowing, or using your arms, hands, or legs.  · You feel generally weak.  · You are not thinking clearly or you have trouble forming sentences. It may take a friend or family member to notice this.  · You have chest pain, abdominal pain, shortness of breath, or sweating.  · Your vision changes.  · You have any bleeding.  · You have a severe headache.  · You have neck pain or a stiff neck.  · You have a fever.  These symptoms may represent a serious problem that is an emergency. Do not wait to see if the symptoms will go away. Get medical help right away. Call your local emergency services (911 in the [place] Do not drive yourself to the hospital.  Summary  · Dizziness is a feeling of unsteadiness or light-headedness. This condition can be caused by  a number of things, including medicines, dehydration, or illness.  · Anyone can become dizzy, but dizziness is more common in older adults.  · Drink enough fluid to keep your urine clear or pale yellow. Do not drink alcohol.  · Avoid making quick movements if you feel dizzy. Monitor your dizziness for any changes.  This information is not intended to replace advice given to you by your health care provider. Make sure you discuss any questions you have with your health care provider.  Document Released: 06/13/2002 Document Revised: 12/21/2018 Document Reviewed: 01/20/2018  [employer] Patient Education © 2020 [employer]    Lisinopril tablets  What is this medicine?  LISINOPRIL (lyse IN oh pril) is an ACE inhibitor. This medicine is used to treat high blood pressure and heart failure. It is also used to protect the heart immediately after a heart attack.  This medicine may be used for other purposes; ask your health care provider or pharmacist if you have questions.  COMMON BRAND NAME(S): Prinivil, Zestril  What should I tell my health care provider before I take this medicine?  They need to know if you have any of these conditions:  diabetes  heart or blood vessel disease  kidney disease  low blood pressure  previous swelling of the tongue, face, or lips with difficulty breathing, difficulty swallowing, hoarseness, or tightening of the throat  an unusual or allergic reaction to lisinopril, other ACE inhibitors, insect venom, foods, dyes, or preservatives  pregnant or trying to get pregnant  breast-feeding  How should I use this medicine?  Take this medicine by mouth with a glass of water. Follow the directions on your prescription label. You may take this medicine with or without food. If it upsets your stomach, take it with food. Take your medicine at regular intervals. Do not take it more often than directed. Do not stop taking except on your doctor's advice.  Talk to your pediatrician regarding the use of this  medicine in children. Special care may be needed. While this drug may be prescribed for children as young as 6 years of age for selected conditions, precautions do apply.  Overdosage: If you think you have taken too much of this medicine contact a poison control center or emergency room at once.  NOTE: This medicine is only for you. Do not share this medicine with others.  What if I miss a dose?  If you miss a dose, take it as soon as you can. If it is almost time for your next dose, take only that dose. Do not take double or extra doses.  What may interact with this medicine?  Do not take this medicine with any of the following medications:  hymenoptera venom  sacubitril; valsartan  This medicines may also interact with the following medications:  aliskiren  angiotensin receptor blockers, like losartan or valsartan  certain medicines for diabetes  diuretics  everolimus  gold compounds  lithium  NSAIDs, medicines for pain and inflammation, like ibuprofen or naproxen  potassium salts or supplements  salt substitutes  sirolimus  temsirolimus  This list may not describe all possible interactions. Give your health care provider a list of all the medicines, herbs, non-prescription drugs, or dietary supplements you use. Also tell them if you smoke, drink alcohol, or use illegal drugs. Some items may interact with your medicine.  What should I watch for while using this medicine?  Visit your doctor or health care professional for regular check ups. Check your blood pressure as directed. Ask your doctor what your blood pressure should be, and when you should contact him or her.  Do not treat yourself for coughs, colds, or pain while you are using this medicine without asking your doctor or health care professional for advice. Some ingredients may increase your blood pressure.  Women should inform their doctor if they wish to become pregnant or think they might be pregnant. There is a potential for serious side effects to an  unborn child. Talk to your health care professional or pharmacist for more information.  Check with your doctor or health care professional if you get an attack of severe diarrhea, nausea and vomiting, or if you sweat a lot. The loss of too much body fluid can make it dangerous for you to take this medicine.  You may get drowsy or dizzy. Do not drive, use machinery, or do anything that needs mental alertness until you know how this drug affects you. Do not stand or sit up quickly, especially if you are an older patient. This reduces the risk of dizzy or fainting spells. Alcohol can make you more drowsy and dizzy. Avoid alcoholic drinks.  Avoid salt substitutes unless you are told otherwise by your doctor or health care professional.  What side effects may I notice from receiving this medicine?  Side effects that you should report to your doctor or health care professional as soon as possible:  allergic reactions like skin rash, itching or hives, swelling of the hands, feet, face, lips, throat, or tongue  breathing problems  signs and symptoms of kidney injury like trouble passing urine or change in the amount of urine  signs and symptoms of increased potassium like muscle weakness; chest pain; or fast, irregular heartbeat  signs and symptoms of liver injury like dark yellow or brown urine; general ill feeling or flu-like symptoms; light-colored stools; loss of appetite; nausea; right upper belly pain; unusually weak or tired; yellowing of the eyes or skin  signs and symptoms of low blood pressure like dizziness; feeling faint or lightheaded, falls; unusually weak or tired  stomach pain with or without nausea and vomiting  Side effects that usually do not require medical attention (report to your doctor or health care professional if they continue or are bothersome):  changes in taste  cough  dizziness  fever  headache  sensitivity to light  This list may not describe all possible side effects. Call your doctor for  medical advice about side effects. You may report side effects to FDA at 8-859-JGX-3550.  Where should I keep my medicine?  Keep out of the reach of children.  Store at room temperature between 15 and 30 degrees C (59 and 86 degrees F). Protect from moisture. Keep container tightly closed. Throw away any unused medicine after the expiration date.  NOTE: This sheet is a summary. It may not cover all possible information. If you have questions about this medicine, talk to your doctor, pharmacist, or health care provider.  © 2020 [employer] (2017-02-06 12:52:35)

## 2022-03-24 DIAGNOSIS — I10 HYPERTENSION, UNSPECIFIED TYPE: ICD-10-CM

## 2022-03-24 RX ORDER — LISINOPRIL 20 MG/1
20 TABLET ORAL
Qty: 30 TABLET | Refills: 0 | Status: SHIPPED | OUTPATIENT
Start: 2022-03-24 | End: 2022-04-28 | Stop reason: SDUPTHER

## 2022-03-24 NOTE — TELEPHONE ENCOUNTER
Received request via: Patient    Was the patient seen in the last year in this department? No    Does the patient have an active prescription (recently filled or refills available) for medication(s) requested? PT has apt. on Monday- she needs short term med

## 2022-04-28 ENCOUNTER — OFFICE VISIT (OUTPATIENT)
Dept: MEDICAL GROUP | Facility: MEDICAL CENTER | Age: 37
End: 2022-04-28

## 2022-04-28 VITALS
OXYGEN SATURATION: 98 % | DIASTOLIC BLOOD PRESSURE: 88 MMHG | WEIGHT: 196.65 LBS | SYSTOLIC BLOOD PRESSURE: 138 MMHG | TEMPERATURE: 97.3 F | HEIGHT: 63 IN | HEART RATE: 72 BPM | BODY MASS INDEX: 34.84 KG/M2 | RESPIRATION RATE: 16 BRPM

## 2022-04-28 DIAGNOSIS — I10 ESSENTIAL HYPERTENSION: ICD-10-CM

## 2022-04-28 PROCEDURE — 99213 OFFICE O/P EST LOW 20 MIN: CPT | Performed by: PHYSICIAN ASSISTANT

## 2022-04-28 RX ORDER — LISINOPRIL 20 MG/1
20 TABLET ORAL
Qty: 90 TABLET | Refills: 0 | Status: SHIPPED | OUTPATIENT
Start: 2022-04-28 | End: 2022-07-25

## 2022-04-28 ASSESSMENT — FIBROSIS 4 INDEX: FIB4 SCORE: 0.86

## 2022-04-28 NOTE — PROGRESS NOTES
"Chief Complaint   Patient presents with   • Hypertension Follow-up     Med refill        HPI  Martha Hobbs is a 36 y.o. female here today for HTN.  [facility]        Pt takes lisinopril 20 Mg daily for hypertension.  Has been without medicine for 4 days and has been experiencing some headache.  denies any SOB or CP, no lower leg swelling.            Exam:  /88 (BP Location: Left arm, Patient Position: Sitting)   Pulse 72   Temp 36.3 °C (97.3 °F) (Temporal)   Resp 16   Ht 1.6 m (5' 3\")   Wt 89.2 kg (196 lb 10.4 oz)   SpO2 98%       Constitutional: Alert, oriented in no acute distress.  Psych: Eye contact is good, speech goal directed, affect calm  Eyes: Conjunctiva non-injected, sclera non-icteric.  Lungs: Unlabored respiratory effort, clear to auscultation bilaterally with good excursion, no wheez or rhonci  CV: regular rate and rhythm. No lower extremity edema        A/P:    1. Essential hypertension  Patient is cash pay, can do all preventive labs however ordered a BMP to check her sodium and GFR to be done at [facility]    - Basic Metabolic Panel; Future  - lisinopril (PRINIVIL) 20 MG Tab; Take 1 Tablet by mouth every day.  Dispense: 90 Tablet; Refill: 0    Once results are received I will give her 1 more refill for blood pressure meds.    F/U: 1 yrs for HTN  "

## 2022-09-12 DIAGNOSIS — I10 ESSENTIAL HYPERTENSION: ICD-10-CM

## 2022-09-12 RX ORDER — LISINOPRIL 20 MG/1
20 TABLET ORAL
Qty: 15 TABLET | Refills: 0 | Status: SHIPPED | OUTPATIENT
Start: 2022-09-12 | End: 2022-11-02 | Stop reason: SDUPTHER

## 2022-10-10 DIAGNOSIS — I10 ESSENTIAL HYPERTENSION: ICD-10-CM

## 2022-10-10 RX ORDER — LISINOPRIL 20 MG/1
20 TABLET ORAL
Qty: 30 TABLET | Refills: 0 | OUTPATIENT
Start: 2022-10-10

## 2022-11-02 ENCOUNTER — APPOINTMENT (OUTPATIENT)
Dept: RADIOLOGY | Facility: MEDICAL CENTER | Age: 37
End: 2022-11-02
Attending: EMERGENCY MEDICINE

## 2022-11-02 ENCOUNTER — HOSPITAL ENCOUNTER (EMERGENCY)
Facility: MEDICAL CENTER | Age: 37
End: 2022-11-02
Attending: EMERGENCY MEDICINE

## 2022-11-02 VITALS
HEART RATE: 61 BPM | OXYGEN SATURATION: 98 % | HEIGHT: 63 IN | SYSTOLIC BLOOD PRESSURE: 129 MMHG | DIASTOLIC BLOOD PRESSURE: 85 MMHG | BODY MASS INDEX: 35.51 KG/M2 | WEIGHT: 200.4 LBS | TEMPERATURE: 98 F | RESPIRATION RATE: 16 BRPM

## 2022-11-02 DIAGNOSIS — R10.9 ABDOMINAL CRAMPING: ICD-10-CM

## 2022-11-02 DIAGNOSIS — I10 ESSENTIAL HYPERTENSION: ICD-10-CM

## 2022-11-02 DIAGNOSIS — N39.0 URINARY TRACT INFECTION WITHOUT HEMATURIA, SITE UNSPECIFIED: ICD-10-CM

## 2022-11-02 DIAGNOSIS — K59.00 CONSTIPATION, UNSPECIFIED CONSTIPATION TYPE: ICD-10-CM

## 2022-11-02 DIAGNOSIS — R10.84 GENERALIZED ABDOMINAL PAIN: ICD-10-CM

## 2022-11-02 DIAGNOSIS — Z76.0 MEDICATION REFILL: ICD-10-CM

## 2022-11-02 LAB
ALBUMIN SERPL BCP-MCNC: 4.4 G/DL (ref 3.2–4.9)
ALBUMIN/GLOB SERPL: 1.6 G/DL
ALP SERPL-CCNC: 50 U/L (ref 30–99)
ALT SERPL-CCNC: 16 U/L (ref 2–50)
ANION GAP SERPL CALC-SCNC: 13 MMOL/L (ref 7–16)
APPEARANCE UR: ABNORMAL
AST SERPL-CCNC: 16 U/L (ref 12–45)
BACTERIA #/AREA URNS HPF: ABNORMAL /HPF
BASOPHILS # BLD AUTO: 0.4 % (ref 0–1.8)
BASOPHILS # BLD: 0.02 K/UL (ref 0–0.12)
BILIRUB SERPL-MCNC: 0.4 MG/DL (ref 0.1–1.5)
BILIRUB UR QL STRIP.AUTO: NEGATIVE
BUN SERPL-MCNC: 12 MG/DL (ref 8–22)
CALCIUM SERPL-MCNC: 9.1 MG/DL (ref 8.5–10.5)
CHLORIDE SERPL-SCNC: 102 MMOL/L (ref 96–112)
CO2 SERPL-SCNC: 22 MMOL/L (ref 20–33)
COLOR UR: YELLOW
CREAT SERPL-MCNC: 0.52 MG/DL (ref 0.5–1.4)
EOSINOPHIL # BLD AUTO: 0.09 K/UL (ref 0–0.51)
EOSINOPHIL NFR BLD: 1.8 % (ref 0–6.9)
EPI CELLS #/AREA URNS HPF: ABNORMAL /HPF
ERYTHROCYTE [DISTWIDTH] IN BLOOD BY AUTOMATED COUNT: 42.7 FL (ref 35.9–50)
GFR SERPLBLD CREATININE-BSD FMLA CKD-EPI: 122 ML/MIN/1.73 M 2
GLOBULIN SER CALC-MCNC: 2.7 G/DL (ref 1.9–3.5)
GLUCOSE SERPL-MCNC: 119 MG/DL (ref 65–99)
GLUCOSE UR STRIP.AUTO-MCNC: NEGATIVE MG/DL
HCG SERPL QL: NEGATIVE
HCT VFR BLD AUTO: 38 % (ref 37–47)
HGB BLD-MCNC: 13.2 G/DL (ref 12–16)
HYALINE CASTS #/AREA URNS LPF: ABNORMAL /LPF
IMM GRANULOCYTES # BLD AUTO: 0.01 K/UL (ref 0–0.11)
IMM GRANULOCYTES NFR BLD AUTO: 0.2 % (ref 0–0.9)
KETONES UR STRIP.AUTO-MCNC: NEGATIVE MG/DL
LEUKOCYTE ESTERASE UR QL STRIP.AUTO: ABNORMAL
LIPASE SERPL-CCNC: 24 U/L (ref 11–82)
LYMPHOCYTES # BLD AUTO: 1.76 K/UL (ref 1–4.8)
LYMPHOCYTES NFR BLD: 35.3 % (ref 22–41)
MCH RBC QN AUTO: 33.8 PG (ref 27–33)
MCHC RBC AUTO-ENTMCNC: 34.7 G/DL (ref 33.6–35)
MCV RBC AUTO: 97.4 FL (ref 81.4–97.8)
MICRO URNS: ABNORMAL
MONOCYTES # BLD AUTO: 0.33 K/UL (ref 0–0.85)
MONOCYTES NFR BLD AUTO: 6.6 % (ref 0–13.4)
NEUTROPHILS # BLD AUTO: 2.78 K/UL (ref 2–7.15)
NEUTROPHILS NFR BLD: 55.7 % (ref 44–72)
NITRITE UR QL STRIP.AUTO: NEGATIVE
NRBC # BLD AUTO: 0 K/UL
NRBC BLD-RTO: 0 /100 WBC
PH UR STRIP.AUTO: 6 [PH] (ref 5–8)
PLATELET # BLD AUTO: 193 K/UL (ref 164–446)
PMV BLD AUTO: 9.4 FL (ref 9–12.9)
POTASSIUM SERPL-SCNC: 3.6 MMOL/L (ref 3.6–5.5)
PROT SERPL-MCNC: 7.1 G/DL (ref 6–8.2)
PROT UR QL STRIP: NEGATIVE MG/DL
RBC # BLD AUTO: 3.9 M/UL (ref 4.2–5.4)
RBC # URNS HPF: ABNORMAL /HPF
RBC UR QL AUTO: NEGATIVE
SODIUM SERPL-SCNC: 137 MMOL/L (ref 135–145)
SP GR UR STRIP.AUTO: 1.02
UROBILINOGEN UR STRIP.AUTO-MCNC: 0.2 MG/DL
WBC # BLD AUTO: 5 K/UL (ref 4.8–10.8)
WBC #/AREA URNS HPF: ABNORMAL /HPF

## 2022-11-02 PROCEDURE — 87086 URINE CULTURE/COLONY COUNT: CPT

## 2022-11-02 PROCEDURE — 84703 CHORIONIC GONADOTROPIN ASSAY: CPT

## 2022-11-02 PROCEDURE — 87186 SC STD MICRODIL/AGAR DIL: CPT

## 2022-11-02 PROCEDURE — 36415 COLL VENOUS BLD VENIPUNCTURE: CPT

## 2022-11-02 PROCEDURE — 87077 CULTURE AEROBIC IDENTIFY: CPT

## 2022-11-02 PROCEDURE — 81001 URINALYSIS AUTO W/SCOPE: CPT

## 2022-11-02 PROCEDURE — 99284 EMERGENCY DEPT VISIT MOD MDM: CPT

## 2022-11-02 PROCEDURE — 85025 COMPLETE CBC W/AUTO DIFF WBC: CPT

## 2022-11-02 PROCEDURE — 76705 ECHO EXAM OF ABDOMEN: CPT

## 2022-11-02 PROCEDURE — 83690 ASSAY OF LIPASE: CPT

## 2022-11-02 PROCEDURE — 80053 COMPREHEN METABOLIC PANEL: CPT

## 2022-11-02 RX ORDER — NITROFURANTOIN 25; 75 MG/1; MG/1
100 CAPSULE ORAL 2 TIMES DAILY
Qty: 10 CAPSULE | Refills: 0 | Status: SHIPPED | OUTPATIENT
Start: 2022-11-02 | End: 2022-11-07

## 2022-11-02 RX ORDER — POLYETHYLENE GLYCOL 3350, SODIUM SULFATE ANHYDROUS, SODIUM BICARBONATE, SODIUM CHLORIDE, POTASSIUM CHLORIDE 227.1; 21.5; 6.36; 5.53; .754 G/L; G/L; G/L; G/L; G/L
POWDER, FOR SOLUTION ORAL
Qty: 1 EACH | Refills: 0 | Status: SHIPPED | OUTPATIENT
Start: 2022-11-02 | End: 2022-11-07

## 2022-11-02 RX ORDER — LISINOPRIL 20 MG/1
20 TABLET ORAL
Qty: 30 TABLET | Refills: 2 | Status: SHIPPED | OUTPATIENT
Start: 2022-11-02 | End: 2022-11-07 | Stop reason: SDUPTHER

## 2022-11-02 ASSESSMENT — FIBROSIS 4 INDEX: FIB4 SCORE: 0.89

## 2022-11-02 ASSESSMENT — LIFESTYLE VARIABLES: DO YOU DRINK ALCOHOL: NO

## 2022-11-02 NOTE — ED PROVIDER NOTES
ED Provider Note    CHIEF COMPLAINT  Chief Complaint   Patient presents with    Constipation     X 1 week, last BM 4 days ago. Nausea on & off, no vomiting, intermittent abdominal cramping. No relief with stool softeners.     Headache     Mild, 4/10 for a few days, Pt denies fever/chills.       CHE Hobbs is a 37 y.o. female who presents with complaint of constipation, no good bowel movement for 4 days.  She has intermittent cramping of her abdomen.  Incidentally for the past 5 days has had numbness and tingling to fingers 4 and 5 of her left hand of unknown etiology.  It has been constant now for 5 days.  No associated weakness.  Mild neck discomfort she states is chronic.  No shoulder pain, no trauma.  Abdominal pain is suprapubic, right upper quadrant, left lower quadrant in association with her constipation.  There is been no bloody bowel movements.  No vomiting.  No chest pain or difficulty breathing.  Patient developed a headache over the past of 4 days described as mild.  No associated neck stiffness.  No vision change, no balance difficulty.  Patient changed blood pressure medication secondary to running out of her own 4 days ago, has been taking her 's.    REVIEW OF SYSTEMS  Constitutional: No fever  Respiratory: No shortness of breath  Cardiac: No chest pain or syncope  Gastrointestinal: Abdominal pains, cramping, constipation.  No vomiting  Musculoskeletal: No flank pain  Neurologic: No headache  Genitourinary: No dysuria       All other systems are negative.     PAST MEDICAL HISTORY  Past Medical History:   Diagnosis Date    Essential hypertension 1/11/2021    Psychiatric disorder        FAMILY HISTORY  Family History   Problem Relation Age of Onset    Heart Disease Mother        SOCIAL HISTORY  Social History     Socioeconomic History    Marital status: Single   Tobacco Use    Smoking status: Former     Packs/day: 0.25     Types: Cigarettes    Smokeless tobacco: Never    Tobacco  "comments:     quit 01/2022   Vaping Use    Vaping Use: Never used   Substance and Sexual Activity    Alcohol use: Yes     Comment: social     Drug use: No    Sexual activity: Yes       SURGICAL HISTORY  No past surgical history on file.    CURRENT MEDICATIONS  Home Medications       Reviewed by Bettina Dash R.N. (Registered Nurse) on 11/02/22 at 1238  Med List Status: Not Addressed     Medication Last Dose Status   lisinopril (PRINIVIL) 20 MG Tab  Active                    ALLERGIES  No Known Allergies    PHYSICAL EXAM  VITAL SIGNS: /83   Pulse (!) 59   Temp 36.3 °C (97.4 °F) (Temporal)   Resp 15   Ht 1.6 m (5' 3\")   Wt 90.9 kg (200 lb 6.4 oz)   LMP 10/19/2022   SpO2 97%   BMI 35.50 kg/m²   Constitutional: Well-nourished  ENT: Nares clear, mucous membranes moist.  Eyes:  Conjunctiva normal, No discharge.    Lymphatic: No adenopathy.   Cardiovascular: Normal heart rate, Normal rhythm.   Pulmonary: No wheezing, no rales  Gastrointestinal: Suprapubic tenderness.  Right upper quadrant tenderness.  Left lower quadrant tenderness.  Skin: Warm, Dry, No jaundice.   Musculoskeletal:  No CVA tenderness.   Neurologic:  Normal motor and sensory function, No focal deficits noted.  Tingling to the left hand fingers 4 and 5 is subjective.  Psychiatric:Normal affect, Normal mood.    RADIOLOGY/PROCEDURES/Labs  Results for orders placed or performed during the hospital encounter of 11/02/22   CBC WITH DIFFERENTIAL   Result Value Ref Range    WBC 5.0 4.8 - 10.8 K/uL    RBC 3.90 (L) 4.20 - 5.40 M/uL    Hemoglobin 13.2 12.0 - 16.0 g/dL    Hematocrit 38.0 37.0 - 47.0 %    MCV 97.4 81.4 - 97.8 fL    MCH 33.8 (H) 27.0 - 33.0 pg    MCHC 34.7 33.6 - 35.0 g/dL    RDW 42.7 35.9 - 50.0 fL    Platelet Count 193 164 - 446 K/uL    MPV 9.4 9.0 - 12.9 fL    Neutrophils-Polys 55.70 44.00 - 72.00 %    Lymphocytes 35.30 22.00 - 41.00 %    Monocytes 6.60 0.00 - 13.40 %    Eosinophils 1.80 0.00 - 6.90 %    Basophils 0.40 0.00 - 1.80 % "    Immature Granulocytes 0.20 0.00 - 0.90 %    Nucleated RBC 0.00 /100 WBC    Neutrophils (Absolute) 2.78 2.00 - 7.15 K/uL    Lymphs (Absolute) 1.76 1.00 - 4.80 K/uL    Monos (Absolute) 0.33 0.00 - 0.85 K/uL    Eos (Absolute) 0.09 0.00 - 0.51 K/uL    Baso (Absolute) 0.02 0.00 - 0.12 K/uL    Immature Granulocytes (abs) 0.01 0.00 - 0.11 K/uL    NRBC (Absolute) 0.00 K/uL   COMP METABOLIC PANEL   Result Value Ref Range    Sodium 137 135 - 145 mmol/L    Potassium 3.6 3.6 - 5.5 mmol/L    Chloride 102 96 - 112 mmol/L    Co2 22 20 - 33 mmol/L    Anion Gap 13.0 7.0 - 16.0    Glucose 119 (H) 65 - 99 mg/dL    Bun 12 8 - 22 mg/dL    Creatinine 0.52 0.50 - 1.40 mg/dL    Calcium 9.1 8.5 - 10.5 mg/dL    AST(SGOT) 16 12 - 45 U/L    ALT(SGPT) 16 2 - 50 U/L    Alkaline Phosphatase 50 30 - 99 U/L    Total Bilirubin 0.4 0.1 - 1.5 mg/dL    Albumin 4.4 3.2 - 4.9 g/dL    Total Protein 7.1 6.0 - 8.2 g/dL    Globulin 2.7 1.9 - 3.5 g/dL    A-G Ratio 1.6 g/dL   LIPASE   Result Value Ref Range    Lipase 24 11 - 82 U/L   HCG QUAL SERUM   Result Value Ref Range    Beta-Hcg Qualitative Serum Negative Negative   URINALYSIS,CULTURE IF INDICATED    Specimen: Urine   Result Value Ref Range    Color Yellow     Character Cloudy (A)     Specific Gravity 1.019 <1.035    Ph 6.0 5.0 - 8.0    Glucose Negative Negative mg/dL    Ketones Negative Negative mg/dL    Protein Negative Negative mg/dL    Bilirubin Negative Negative    Urobilinogen, Urine 0.2 Negative    Nitrite Negative Negative    Leukocyte Esterase Moderate (A) Negative    Occult Blood Negative Negative    Micro Urine Req Microscopic    ESTIMATED GFR   Result Value Ref Range    GFR (CKD-EPI) 122 >60 mL/min/1.73 m 2   URINE MICROSCOPIC (W/UA)   Result Value Ref Range    WBC 10-20 (A) /hpf    RBC 0-2 /hpf    Bacteria Moderate (A) None /hpf    Epithelial Cells Many (A) /hpf    Hyaline Cast 0-2 /lpf   URINE CULTURE(NEW)    Specimen: Urine   Result Value Ref Range    Significant Indicator NEG      Source UR     Site -     Culture Result -      US-RUQ   Final Result      Unremarkable RIGHT upper quadrant ultrasound            COURSE & MEDICAL DECISION MAKING  Pertinent Labs & Imaging studies reviewed. (See chart for details)  Patient with right upper quadrant tenderness, 3 prior pregnancies, concerning for biliary colic, stone development.  Pulido sign was negative, no fever, no leukocytosis, no evidence of cholecystitis therefore.  Ultrasound was negative for stones.  Patient did have some lower abdominal tenderness, urinary tract infection considered, urinalysis showed 10-20 white cells, moderate leukocyte esterase, she is prescribed Macrobid.  Patient's pain likely secondary to a week of constipation, there was tenderness both lower and upper abdomen.  Patient does not meet criteria for CT scan of the abdomen.  She is placed on GoLytely to help relieve the constipation.  Patient and her  both stated they were comfortable with this plan, they have agreed to return should symptoms worsen or for any concerns.  Patient requested medication refill for her hypertension, lisinopril prescription has been sent into the pharmacy.  The patient is well-appearing upon discharge    FINAL IMPRESSION  1. Constipation, unspecified constipation type  PEG 3350-KCl-NaBcb-NaCl-NaSulf (GOLYTELY) 227.1 GM Pack      2. Abdominal cramping        3. Generalized abdominal pain        4. Urinary tract infection without hematuria, site unspecified  nitrofurantoin (MACROBID) 100 MG Cap      5. Medication refill        6. Essential hypertension  lisinopril (PRINIVIL) 20 MG Tab              Electronically signed by: Miguelangel Agee M.D., 11/2/2022 1:46 PM

## 2022-11-02 NOTE — DISCHARGE INSTRUCTIONS
Return for worsening pain, fever, any concerns.  Follow-up with your primary doctor for recheck as soon as possible

## 2022-11-02 NOTE — ED TRIAGE NOTES
Martha Hobbs  37 y.o.  female  Chief Complaint   Patient presents with    Constipation     X 1 week, last BM 4 days ago. Nausea on & off, no vomiting, intermittent abdominal cramping. No relief with stool softeners.     Headache     Mild, 4/10 for a few days, Pt denies fever/chills.       Patient educated on triage process, to alert staff if any changes in condition.

## 2022-11-03 PROCEDURE — 87077 CULTURE AEROBIC IDENTIFY: CPT

## 2022-11-03 PROCEDURE — 87086 URINE CULTURE/COLONY COUNT: CPT

## 2022-11-03 PROCEDURE — 87186 SC STD MICRODIL/AGAR DIL: CPT

## 2022-11-05 LAB
BACTERIA UR CULT: ABNORMAL
BACTERIA UR CULT: ABNORMAL
SIGNIFICANT IND 70042: ABNORMAL
SITE SITE: ABNORMAL
SOURCE SOURCE: ABNORMAL

## 2022-11-07 ENCOUNTER — TELEMEDICINE (OUTPATIENT)
Dept: MEDICAL GROUP | Facility: MEDICAL CENTER | Age: 37
End: 2022-11-07

## 2022-11-07 VITALS — HEIGHT: 63 IN | WEIGHT: 200 LBS | BODY MASS INDEX: 35.44 KG/M2 | TEMPERATURE: 98.6 F

## 2022-11-07 DIAGNOSIS — I10 ESSENTIAL HYPERTENSION: ICD-10-CM

## 2022-11-07 DIAGNOSIS — E78.5 HYPERLIPIDEMIA, UNSPECIFIED HYPERLIPIDEMIA TYPE: ICD-10-CM

## 2022-11-07 DIAGNOSIS — N30.00 ACUTE CYSTITIS WITHOUT HEMATURIA: ICD-10-CM

## 2022-11-07 DIAGNOSIS — E66.09 CLASS 1 OBESITY DUE TO EXCESS CALORIES WITHOUT SERIOUS COMORBIDITY WITH BODY MASS INDEX (BMI) OF 34.0 TO 34.9 IN ADULT: ICD-10-CM

## 2022-11-07 DIAGNOSIS — K59.09 OTHER CONSTIPATION: ICD-10-CM

## 2022-11-07 PROCEDURE — 99214 OFFICE O/P EST MOD 30 MIN: CPT | Mod: 95 | Performed by: FAMILY MEDICINE

## 2022-11-07 RX ORDER — LISINOPRIL 20 MG/1
20 TABLET ORAL
Qty: 90 TABLET | Refills: 3 | Status: SHIPPED | OUTPATIENT
Start: 2022-11-07

## 2022-11-07 ASSESSMENT — FIBROSIS 4 INDEX: FIB4 SCORE: 0.77

## 2022-11-07 NOTE — Clinical Note
Please request pap from [facility] done earlier this year And 1 month follow up for labs in office.

## 2022-11-07 NOTE — PROGRESS NOTES
"Virtual Visit: Established Patient   This visit was conducted via Zoom using secure and encrypted videoconferencing technology.   The patient was in their home in the state [facility].    The patient's identity was confirmed and verbal consent was obtained for this virtual visit.     Subjective:   CC:   Chief Complaint   Patient presents with    Hospital Follow-up    Lab Results       Martha Hobbs is a 37 y.o. female presenting for evaluation and management of    Acute cystitis without hematuria  The patient recently was seen in the ER on 11/2/2022.  Treated with Macrobid.  Doing well, almost done with abx.   She presented with abdominal pain, likely combination of UTI and constipation.    Other constipation  Seen in ER  Given MoM, doing myuch better  No further pain    Essential hypertension  Chronic problem. Was off of her lisinopril for a week, she ran out.  Requesting 1 year refill.  Unfortunately, she does not have insurance now.    Class 1 obesity due to excess calories without serious comorbidity with body mass index (BMI) of 34.0 to 34.9 in adult  Chornic problem.   Very active at work  Has been eating healthier.        Current medicines (including changes today)  Current Outpatient Medications   Medication Sig Dispense Refill    lisinopril (PRINIVIL) 20 MG Tab Take 1 Tablet by mouth every day. 90 Tablet 3    nitrofurantoin (MACROBID) 100 MG Cap Take 1 Capsule by mouth 2 times a day for 5 days. 10 Capsule 0     No current facility-administered medications for this visit.       Patient Active Problem List    Diagnosis Date Noted    Acute cystitis without hematuria 11/07/2022    Other constipation 11/07/2022    Class 1 obesity due to excess calories without serious comorbidity with body mass index (BMI) of 34.0 to 34.9 in adult 01/11/2021    Essential hypertension 01/11/2021    Bipolar depression (HCC) 01/11/2021        Objective:   Temp 37 °C (98.6 °F) (Temporal)   Ht 1.6 m (5' 3\")   Wt 90.7 kg (200 lb)   " LMP 10/19/2022   BMI 35.43 kg/m²     Physical Exam:  Constitutional: Alert, no distress, well-groomed.  Skin: No rashes in visible areas.  Eye: Round. Conjunctiva clear, lids normal. No icterus.   ENMT: Lips pink without lesions, good dentition, moist mucous membranes. Phonation normal.  Neck: No masses, no thyromegaly. Moves freely without pain.  Respiratory: Unlabored respiratory effort, no cough or audible wheeze  Psych: Alert and oriented x3, normal affect and mood.     Assessment and Plan:   The following treatment plan was discussed:     1. Acute cystitis without hematuria  Seems to be resolved, pain is resolved.  On her last day of Macrobid.    2. Other constipation  Resolved with milk of magnesia, abdominal pain has resolved.  Reviewed ultrasound which was negative.    3. Essential hypertension  - Lipid Profile; Future  - Basic Metabolic Panel; Future  - lisinopril (PRINIVIL) 20 MG Tab; Take 1 Tablet by mouth every day.  Dispense: 90 Tablet; Refill: 3  Chronic problem, patient reports well-controlled, refill placed for the next year on her lisinopril.  Reviewed kidney function, looked great in the ER.    4. Hyperlipidemia, unspecified hyperlipidemia type  - Lipid Profile; Future    5. Class 1 obesity due to excess calories without serious comorbidity with body mass index (BMI) of 34.0 to 34.9 in adult      Follow-up: Return in about 4 weeks (around 12/5/2022) for to review labs if able.

## 2022-11-10 NOTE — ED NOTES
"ED Positive Culture Follow-up/Notification Note:    Date: 11/9/2022     Patient seen in the ED on 11/2/2022 for constipation, abdominal pain, and headache. No vomiting, dysuria, or flank pain. Patient is afebrile and has no leukocytosis. No CVA tenderness on exam. Urine culture was taken and patient was discharged on 5 days of nitrofurantoin.  1. Constipation, unspecified constipation type    2. Abdominal cramping    3. Generalized abdominal pain    4. Urinary tract infection without hematuria, site unspecified    5. Medication refill    6. Essential hypertension       Discharge Medication List as of 11/2/2022  4:28 PM        START taking these medications    Details   nitrofurantoin (MACROBID) 100 MG Cap Take 1 Capsule by mouth 2 times a day for 5 days., Disp-10 Capsule, R-0, Normal      PEG 3350-KCl-NaBcb-NaCl-NaSulf (GOLYTELY) 227.1 GM Pack Drink 100 cc as needed every 10 minutes as needed for relief of constipation, Disp-1 Each, R-0, Normal             Allergies: Patient has no known allergies.     Vitals:    11/02/22 1213 11/02/22 1233 11/02/22 1624   BP: 137/83  129/85   Pulse: (!) 59  61   Resp: 15  16   Temp: 36.3 °C (97.4 °F)  36.7 °C (98 °F)   TempSrc: Temporal  Temporal   SpO2: 97%  98%   Weight:  90.9 kg (200 lb 6.4 oz)    Height:  1.6 m (5' 3\")        Final cultures:   Results       Procedure Component Value Units Date/Time    URINE CULTURE(NEW) [[id number]]  (Abnormal)  (Susceptibility) Collected: 11/02/22 1329    Order Status: Completed Specimen: Urine Updated: 11/08/22 0752     Significant Indicator POS     Source UR     Site -     Culture Result Usual urogenital reynold ,000 cfu/mL      Escherichia coli  ,000 cfu/mL      Narrative:      Indication for culture:->Patient WITHOUT an indwelling Rico  catheter in place with new onset of Dysuria, Frequency,  Urgency, and/or Suprapubic pain    Susceptibility       Escherichia coli (1)       Antibiotic Interpretation Microscan   Method Status    " Amikacin  [*]  Sensitive <=16 mcg/mL DWAIN Final    Ampicillin Sensitive <=8 mcg/mL DWAIN Final    Amoxicillin/CA  [*]  Sensitive <=8/4 mcg/mL DWAIN Final    Aztreonam  [*]  Sensitive <=4 mcg/mL DWAIN Final    Ceftolozane+Tazobactam  [*]  Sensitive <=2 mcg/mL DWAIN Final    Ceftriaxone Sensitive <=1 mcg/mL DWAIN Final    Ceftazidime  [*]  Sensitive <=1 mcg/mL DWAIN Final    Cefazolin Sensitive <=2 mcg/mL DAWIN Final     Breakpoints when Cefazolin is used for therapy of infections  other than uncomplicated UTIs due to Enterobacterales are as  follows:  DWAIN and Interpretation:  <=2 S  4 I  >=8 R         Ciprofloxacin Sensitive <=0.25 mcg/mL DWAIN Final    Cefepime Sensitive <=2 mcg/mL DWAIN Final    Cefuroxime Sensitive <=4 mcg/mL DWAIN Final    Ceftazidime+Avibactam  [*]  Sensitive <=4 mcg/mL DWAIN Final    Ampicillin/sulbactam Sensitive <=4/2 mcg/mL DWAIN Final    Ertapenem  [*]  Sensitive <=0.5 mcg/mL DWAIN Final    Tobramycin Sensitive <=2 mcg/mL DWAIN Final    Nitrofurantoin Sensitive <=32 mcg/mL DWAIN Final    Gentamicin Sensitive <=2 mcg/mL DWAIN Final    Imipenem  [*]  Sensitive <=1 mcg/mL DWAIN Final    Levofloxacin Sensitive <=0.5 mcg/mL DWAIN Final    Meropenem  [*]  Sensitive <=1 mcg/mL DWAIN Final    Meropenem/Vaborbactam  [*]  Sensitive <=2 mcg/mL DWAIN Final    Minocycline Sensitive <=4 mcg/mL DWAIN Final    Pip/Tazobactam Sensitive <=8 mcg/mL DWAIN Final    Trimeth/Sulfa Sensitive <=0.5/9.5 mcg/mL DWAIN Final    Tetracycline  [*]  Sensitive <=4 mcg/mL DWAIN Final    Tigecycline Sensitive <=2 mcg/mL DWAIN Final               [*]  Suppressed Antibiotic                           Plan:   Urine culture grew E coli sensitive to nitrofurantoin. No dysuria or CVA tenderness. Antibiotic course is complete. No changes are required.    Cody Patel, PharmD

## 2024-03-11 ENCOUNTER — APPOINTMENT (OUTPATIENT)
Dept: MEDICAL GROUP | Facility: PHYSICIAN GROUP | Age: 39
End: 2024-03-11

## 2025-04-08 ENCOUNTER — APPOINTMENT (OUTPATIENT)
Dept: MEDICAL GROUP | Facility: MEDICAL CENTER | Age: 40
End: 2025-04-08
Payer: COMMERCIAL

## 2025-04-11 ENCOUNTER — HOSPITAL ENCOUNTER (OUTPATIENT)
Facility: MEDICAL CENTER | Age: 40
End: 2025-04-11
Attending: NURSE PRACTITIONER
Payer: COMMERCIAL

## 2025-04-11 ENCOUNTER — OFFICE VISIT (OUTPATIENT)
Dept: MEDICAL GROUP | Facility: MEDICAL CENTER | Age: 40
End: 2025-04-11
Attending: NURSE PRACTITIONER
Payer: COMMERCIAL

## 2025-04-11 VITALS
RESPIRATION RATE: 16 BRPM | SYSTOLIC BLOOD PRESSURE: 100 MMHG | HEIGHT: 62 IN | WEIGHT: 178.1 LBS | TEMPERATURE: 98 F | OXYGEN SATURATION: 100 % | HEART RATE: 68 BPM | BODY MASS INDEX: 32.77 KG/M2 | DIASTOLIC BLOOD PRESSURE: 62 MMHG

## 2025-04-11 DIAGNOSIS — E66.811 CLASS 1 OBESITY DUE TO EXCESS CALORIES WITH BODY MASS INDEX (BMI) OF 32.0 TO 32.9 IN ADULT, UNSPECIFIED WHETHER SERIOUS COMORBIDITY PRESENT: ICD-10-CM

## 2025-04-11 DIAGNOSIS — Z12.31 ENCOUNTER FOR SCREENING MAMMOGRAM FOR MALIGNANT NEOPLASM OF BREAST: ICD-10-CM

## 2025-04-11 DIAGNOSIS — Z11.59 NEED FOR HEPATITIS C SCREENING TEST: ICD-10-CM

## 2025-04-11 DIAGNOSIS — Z11.3 ROUTINE SCREENING FOR STI (SEXUALLY TRANSMITTED INFECTION): ICD-10-CM

## 2025-04-11 DIAGNOSIS — E66.09 CLASS 1 OBESITY DUE TO EXCESS CALORIES WITH BODY MASS INDEX (BMI) OF 32.0 TO 32.9 IN ADULT, UNSPECIFIED WHETHER SERIOUS COMORBIDITY PRESENT: ICD-10-CM

## 2025-04-11 DIAGNOSIS — Z13.6 SCREENING FOR CARDIOVASCULAR CONDITION: ICD-10-CM

## 2025-04-11 LAB — AMBIGUOUS DTTM AMBI4: NORMAL

## 2025-04-11 PROCEDURE — 93005 ELECTROCARDIOGRAM TRACING: CPT | Performed by: NURSE PRACTITIONER

## 2025-04-11 PROCEDURE — 99001 SPECIMEN HANDLING PT-LAB: CPT | Performed by: NURSE PRACTITIONER

## 2025-04-11 PROCEDURE — 80307 DRUG TEST PRSMV CHEM ANLYZR: CPT

## 2025-04-11 PROCEDURE — G0480 DRUG TEST DEF 1-7 CLASSES: HCPCS

## 2025-04-11 PROCEDURE — 3074F SYST BP LT 130 MM HG: CPT | Performed by: NURSE PRACTITIONER

## 2025-04-11 PROCEDURE — 3078F DIAST BP <80 MM HG: CPT | Performed by: NURSE PRACTITIONER

## 2025-04-11 PROCEDURE — 99214 OFFICE O/P EST MOD 30 MIN: CPT | Performed by: NURSE PRACTITIONER

## 2025-04-11 PROCEDURE — 99213 OFFICE O/P EST LOW 20 MIN: CPT | Performed by: NURSE PRACTITIONER

## 2025-04-11 RX ORDER — PHENTERMINE HYDROCHLORIDE 37.5 MG/1
37.5 TABLET ORAL
Qty: 30 TABLET | Refills: 0 | Status: SHIPPED | OUTPATIENT
Start: 2025-04-11 | End: 2025-05-11

## 2025-04-13 LAB
AMPHET CTO UR CFM-MCNC: NEGATIVE NG/ML
BARBITURATES CTO UR CFM-MCNC: NEGATIVE NG/ML
BENZODIAZ CTO UR CFM-MCNC: NEGATIVE NG/ML
BUPRENORPHINE UR-MCNC: NEGATIVE NG/ML
CANNABINOIDS CTO UR CFM-MCNC: NORMAL NG/ML
CARISOPRODOL UR-MCNC: NEGATIVE NG/ML
COCAINE CTO UR CFM-MCNC: NEGATIVE NG/ML
CREAT UR-MCNC: 23.9 MG/DL (ref 20–400)
DRUG SCREEN COMMENT UR-IMP: NORMAL
ETHYL GLUCURONIDE UR QL SCN: NORMAL NG/ML
FENTANYL UR-MCNC: NEGATIVE NG/ML
MEPERIDINE CTO UR SCN-MCNC: NEGATIVE NG/ML
METHADONE CTO UR CFM-MCNC: NEGATIVE NG/ML
OPIATES UR QL SCN: NEGATIVE NG/ML
OXYCDOXYM URSCRN 2005102: NEGATIVE NG/ML
PCP CTO UR CFM-MCNC: NEGATIVE NG/ML
PROPOXYPH CTO UR CFM-MCNC: NEGATIVE NG/ML
TAPENTADOL UR-MCNC: NEGATIVE NG/ML
TRAMADOL CTO UR SCN-MCNC: NEGATIVE NG/ML
ZOLPIDEM UR-MCNC: NEGATIVE NG/ML

## 2025-04-14 LAB
ETHYL GLUCURONIDE UR CFM-MCNC: 2495 NG/ML
ETHYL SULFATE UR CFM-MCNC: 287 NG/ML

## 2025-04-16 LAB — THC UR CFM-MCNC: 38 NG/ML

## 2025-05-28 ENCOUNTER — OFFICE VISIT (OUTPATIENT)
Dept: MEDICAL GROUP | Facility: MEDICAL CENTER | Age: 40
End: 2025-05-28
Attending: NURSE PRACTITIONER
Payer: COMMERCIAL

## 2025-05-28 VITALS
TEMPERATURE: 96.5 F | HEIGHT: 62 IN | SYSTOLIC BLOOD PRESSURE: 136 MMHG | BODY MASS INDEX: 33.45 KG/M2 | RESPIRATION RATE: 16 BRPM | OXYGEN SATURATION: 95 % | WEIGHT: 181.8 LBS | HEART RATE: 77 BPM | DIASTOLIC BLOOD PRESSURE: 88 MMHG

## 2025-05-28 DIAGNOSIS — E66.811 CLASS 1 OBESITY DUE TO EXCESS CALORIES WITH BODY MASS INDEX (BMI) OF 32.0 TO 32.9 IN ADULT, UNSPECIFIED WHETHER SERIOUS COMORBIDITY PRESENT: Primary | ICD-10-CM

## 2025-05-28 DIAGNOSIS — E66.09 CLASS 1 OBESITY DUE TO EXCESS CALORIES WITH BODY MASS INDEX (BMI) OF 32.0 TO 32.9 IN ADULT, UNSPECIFIED WHETHER SERIOUS COMORBIDITY PRESENT: Primary | ICD-10-CM

## 2025-05-28 PROCEDURE — 3075F SYST BP GE 130 - 139MM HG: CPT | Performed by: NURSE PRACTITIONER

## 2025-05-28 PROCEDURE — 3079F DIAST BP 80-89 MM HG: CPT | Performed by: NURSE PRACTITIONER

## 2025-05-28 PROCEDURE — 99214 OFFICE O/P EST MOD 30 MIN: CPT | Performed by: NURSE PRACTITIONER

## 2025-05-28 PROCEDURE — 99212 OFFICE O/P EST SF 10 MIN: CPT | Performed by: NURSE PRACTITIONER

## 2025-05-28 RX ORDER — PHENTERMINE HYDROCHLORIDE 37.5 MG/1
37.5 TABLET ORAL
Qty: 30 TABLET | Refills: 0 | Status: SHIPPED | OUTPATIENT
Start: 2025-05-28 | End: 2025-06-27

## 2025-05-29 NOTE — PROGRESS NOTES
Verbal consent was acquired by the patient to use [employer] ambient listening note generation during this visit     Chief Complaint   Patient presents with    Follow-Up       Subjective:     HPI:   History of Present Illness  The patient presents for a follow-up visit.    She was unable to complete her blood work due to insurance issues, which have since been resolved. She has been on a regimen of phentermine, initially experiencing mild side effects such as anxiety attacks and palpitations during the first two days of treatment. However, these symptoms have not recurred. She reports a significant reduction in her morning coffee intake since starting the medication, which she believes has contributed to her weight loss. She expresses a desire to continue the phentermine treatment and requests that the prescription be sent to [facility] pharmacy.    She reports no side effects from the phentermine after the initial days and has noticed weight loss. A urine drug screen showed a small amount of marijuana and alcohol. She has been advised not to consume alcohol while taking phentermine.    SOCIAL HISTORY  The patient admits to marijuana use and alcohol consumption.        No problems updated.    ROS  See HPI     Allergies[1]    Current medicines (including changes today)  Current Medications[2]    Social History[3]    Patient Active Problem List    Diagnosis Date Noted    Acute cystitis without hematuria 11/07/2022    Other constipation 11/07/2022    Class 1 obesity due to excess calories without serious comorbidity with body mass index (BMI) of 34.0 to 34.9 in adult 01/11/2021    Essential hypertension 01/11/2021    Bipolar depression (HCC) 01/11/2021       Family History   Problem Relation Age of Onset    Lung Disease Mother     Cancer Mother     Heart Disease Mother     Lung Disease Father     Cancer Father           Objective:     /88 (BP Location: Left arm, Patient Position: Sitting, BP Cuff Size: Adult)    "Pulse 77   Temp 35.8 °C (96.5 °F) (Temporal)   Resp 16   Ht 1.575 m (5' 2\")   Wt 82.5 kg (181 lb 12.8 oz)   SpO2 95%  Body mass index is 33.25 kg/m².    Physical Exam:  Physical Exam  Vitals reviewed.   Constitutional:       General: She is awake.      Appearance: Normal appearance. She is well-developed.   HENT:      Head: Normocephalic.   Eyes:      Conjunctiva/sclera: Conjunctivae normal.   Cardiovascular:      Rate and Rhythm: Normal rate.   Pulmonary:      Effort: Pulmonary effort is normal. No respiratory distress.   Musculoskeletal:      Cervical back: Neck supple.   Skin:     General: Skin is warm and dry.   Neurological:      Mental Status: She is alert and oriented to person, place, and time.   Psychiatric:         Mood and Affect: Mood normal.         Behavior: Behavior normal. Behavior is cooperative.              Assessment and Plan:     The following treatment plan was discussed:    Problem List Items Addressed This Visit    None  Visit Diagnoses         Class 1 obesity due to excess calories with body mass index (BMI) of 32.0 to 32.9 in adult, unspecified whether serious comorbidity present    -  Primary    Relevant Medications    phentermine (ADIPEX-P) 37.5 MG tablet            Assessment & Plan  1. Weight management.  - Phentermine was started previously, and the patient reported initial mild palpitations which resolved after a few days.  - Urine drug screen showed a small amount of marijuana and alcohol; advised to avoid alcohol while on phentermine.  - Patient reported weight loss with phentermine and no significant side effects after the initial period.  - Prescription for phentermine for one month sent to pharmacy; patient instructed to complete blood work within this period and report any side effects immediately.    Any change or worsening of signs or symptoms, patient encouraged to follow-up or report to emergency room for further evaluation. Patient verbalizes understanding and " agrees.      PLEASE NOTE: This dictation was created using voice recognition software. I have made every reasonable attempt to correct obvious errors, but I expect that there are errors of grammar and possibly content that I did not discover before finalizing the note.       [1] No Known Allergies  [2]   Current Outpatient Medications   Medication Sig Dispense Refill    phentermine (ADIPEX-P) 37.5 MG tablet Take 1 Tablet by mouth every morning before breakfast for 30 days. 30 Tablet 0     No current facility-administered medications for this visit.   [3]   Social History  Tobacco Use    Smoking status: Former     Current packs/day: 0.25     Types: Cigarettes    Smokeless tobacco: Never    Tobacco comments:     quit 01/2022   Vaping Use    Vaping status: Every Day    Substances: Nicotine    Devices: Disposable   Substance Use Topics    Alcohol use: Not Currently     Comment: social     Drug use: Yes     Types: Marijuana     Comment: daily.

## 2025-05-30 ENCOUNTER — HOSPITAL ENCOUNTER (OUTPATIENT)
Dept: LAB | Facility: MEDICAL CENTER | Age: 40
End: 2025-05-30
Attending: NURSE PRACTITIONER
Payer: COMMERCIAL

## 2025-05-30 ENCOUNTER — RESULTS FOLLOW-UP (OUTPATIENT)
Dept: MEDICAL GROUP | Facility: MEDICAL CENTER | Age: 40
End: 2025-05-30

## 2025-05-30 DIAGNOSIS — Z11.59 NEED FOR HEPATITIS C SCREENING TEST: ICD-10-CM

## 2025-05-30 DIAGNOSIS — Z13.6 SCREENING FOR CARDIOVASCULAR CONDITION: ICD-10-CM

## 2025-05-30 DIAGNOSIS — E66.09 CLASS 1 OBESITY DUE TO EXCESS CALORIES WITH BODY MASS INDEX (BMI) OF 32.0 TO 32.9 IN ADULT, UNSPECIFIED WHETHER SERIOUS COMORBIDITY PRESENT: ICD-10-CM

## 2025-05-30 DIAGNOSIS — E66.811 CLASS 1 OBESITY DUE TO EXCESS CALORIES WITH BODY MASS INDEX (BMI) OF 32.0 TO 32.9 IN ADULT, UNSPECIFIED WHETHER SERIOUS COMORBIDITY PRESENT: ICD-10-CM

## 2025-05-30 DIAGNOSIS — N63.41 SUBAREOLAR MASS OF RIGHT BREAST: Primary | ICD-10-CM

## 2025-05-30 LAB
25(OH)D3 SERPL-MCNC: 20 NG/ML (ref 30–100)
ALBUMIN SERPL BCP-MCNC: 3.7 G/DL (ref 3.2–4.9)
ALBUMIN/GLOB SERPL: 1.9 G/DL
ALP SERPL-CCNC: 41 U/L (ref 30–99)
ALT SERPL-CCNC: 15 U/L (ref 2–50)
ANION GAP SERPL CALC-SCNC: 10 MMOL/L (ref 7–16)
AST SERPL-CCNC: 20 U/L (ref 12–45)
BILIRUB SERPL-MCNC: 0.2 MG/DL (ref 0.1–1.5)
BUN SERPL-MCNC: 8 MG/DL (ref 8–22)
CALCIUM ALBUM COR SERPL-MCNC: 8.5 MG/DL (ref 8.5–10.5)
CALCIUM SERPL-MCNC: 8.3 MG/DL (ref 8.5–10.5)
CHLORIDE SERPL-SCNC: 105 MMOL/L (ref 96–112)
CHOLEST SERPL-MCNC: 175 MG/DL (ref 100–199)
CO2 SERPL-SCNC: 22 MMOL/L (ref 20–33)
CREAT SERPL-MCNC: 0.6 MG/DL (ref 0.5–1.4)
ERYTHROCYTE [DISTWIDTH] IN BLOOD BY AUTOMATED COUNT: 46.9 FL (ref 35.9–50)
EST. AVERAGE GLUCOSE BLD GHB EST-MCNC: 97 MG/DL
FERRITIN SERPL-MCNC: 45.8 NG/ML (ref 10–291)
FOLATE SERPL-MCNC: 4.6 NG/ML
GFR SERPLBLD CREATININE-BSD FMLA CKD-EPI: 116 ML/MIN/1.73 M 2
GLOBULIN SER CALC-MCNC: 2 G/DL (ref 1.9–3.5)
GLUCOSE SERPL-MCNC: 91 MG/DL (ref 65–99)
HBA1C MFR BLD: 5 % (ref 4–5.6)
HCT VFR BLD AUTO: 41.6 % (ref 37–47)
HCV AB SER QL: NORMAL
HDLC SERPL-MCNC: 60 MG/DL
HGB BLD-MCNC: 14.5 G/DL (ref 12–16)
HIV 1+2 AB+HIV1 P24 AG SERPL QL IA: NORMAL
LDLC SERPL CALC-MCNC: 88 MG/DL
MCH RBC QN AUTO: 33.9 PG (ref 27–33)
MCHC RBC AUTO-ENTMCNC: 34.9 G/DL (ref 32.2–35.5)
MCV RBC AUTO: 97.2 FL (ref 81.4–97.8)
PLATELET # BLD AUTO: 256 K/UL (ref 164–446)
PMV BLD AUTO: 9.8 FL (ref 9–12.9)
POTASSIUM SERPL-SCNC: 3.9 MMOL/L (ref 3.6–5.5)
PROT SERPL-MCNC: 5.7 G/DL (ref 6–8.2)
RBC # BLD AUTO: 4.28 M/UL (ref 4.2–5.4)
SODIUM SERPL-SCNC: 137 MMOL/L (ref 135–145)
T4 FREE SERPL-MCNC: 1.03 NG/DL (ref 0.93–1.7)
TRIGL SERPL-MCNC: 135 MG/DL (ref 0–149)
TSH SERPL-ACNC: 2.13 UIU/ML (ref 0.38–5.33)
VIT B12 SERPL-MCNC: 234 PG/ML (ref 211–911)
WBC # BLD AUTO: 4.9 K/UL (ref 4.8–10.8)

## 2025-05-30 PROCEDURE — 80053 COMPREHEN METABOLIC PANEL: CPT

## 2025-05-30 PROCEDURE — 36415 COLL VENOUS BLD VENIPUNCTURE: CPT

## 2025-05-30 PROCEDURE — 80061 LIPID PANEL: CPT

## 2025-05-30 PROCEDURE — 82306 VITAMIN D 25 HYDROXY: CPT

## 2025-05-30 PROCEDURE — 83036 HEMOGLOBIN GLYCOSYLATED A1C: CPT

## 2025-05-30 PROCEDURE — 87389 HIV-1 AG W/HIV-1&-2 AB AG IA: CPT

## 2025-05-30 PROCEDURE — 82746 ASSAY OF FOLIC ACID SERUM: CPT

## 2025-05-30 PROCEDURE — 82607 VITAMIN B-12: CPT

## 2025-05-30 PROCEDURE — 82728 ASSAY OF FERRITIN: CPT

## 2025-05-30 PROCEDURE — 86803 HEPATITIS C AB TEST: CPT

## 2025-05-30 PROCEDURE — 85027 COMPLETE CBC AUTOMATED: CPT

## 2025-05-30 PROCEDURE — 84439 ASSAY OF FREE THYROXINE: CPT

## 2025-05-30 PROCEDURE — 84443 ASSAY THYROID STIM HORMONE: CPT

## 2025-06-13 ENCOUNTER — HOSPITAL ENCOUNTER (OUTPATIENT)
Dept: RADIOLOGY | Facility: MEDICAL CENTER | Age: 40
End: 2025-06-13
Attending: NURSE PRACTITIONER
Payer: COMMERCIAL

## 2025-06-13 DIAGNOSIS — Z12.31 ENCOUNTER FOR SCREENING MAMMOGRAM FOR MALIGNANT NEOPLASM OF BREAST: ICD-10-CM

## 2025-06-13 PROCEDURE — 77063 BREAST TOMOSYNTHESIS BI: CPT

## 2025-06-18 ENCOUNTER — OFFICE VISIT (OUTPATIENT)
Dept: MEDICAL GROUP | Facility: MEDICAL CENTER | Age: 40
End: 2025-06-18
Attending: NURSE PRACTITIONER
Payer: COMMERCIAL

## 2025-06-18 VITALS
SYSTOLIC BLOOD PRESSURE: 138 MMHG | WEIGHT: 171.3 LBS | OXYGEN SATURATION: 98 % | RESPIRATION RATE: 16 BRPM | DIASTOLIC BLOOD PRESSURE: 88 MMHG | HEIGHT: 62 IN | TEMPERATURE: 94 F | BODY MASS INDEX: 31.52 KG/M2

## 2025-06-18 DIAGNOSIS — E55.9 VITAMIN D DEFICIENCY: Primary | ICD-10-CM

## 2025-06-18 PROCEDURE — 3075F SYST BP GE 130 - 139MM HG: CPT | Performed by: NURSE PRACTITIONER

## 2025-06-18 PROCEDURE — 99214 OFFICE O/P EST MOD 30 MIN: CPT | Performed by: NURSE PRACTITIONER

## 2025-06-18 PROCEDURE — 3079F DIAST BP 80-89 MM HG: CPT | Performed by: NURSE PRACTITIONER

## 2025-06-18 PROCEDURE — 99001 SPECIMEN HANDLING PT-LAB: CPT | Performed by: NURSE PRACTITIONER

## 2025-06-18 PROCEDURE — 99212 OFFICE O/P EST SF 10 MIN: CPT | Performed by: NURSE PRACTITIONER

## 2025-06-18 RX ORDER — ERGOCALCIFEROL 1.25 MG/1
50000 CAPSULE ORAL
Qty: 12 CAPSULE | Refills: 0 | Status: SHIPPED | OUTPATIENT
Start: 2025-06-18

## 2025-06-18 ASSESSMENT — FIBROSIS 4 INDEX: FIB4 SCORE: 0.81

## 2025-06-18 NOTE — PROGRESS NOTES
"Verbal consent was acquired by the patient to use [employer] ambient listening note generation during this visit     Chief Complaint   Patient presents with    Lab Results       Subjective:     HPI:   History of Present Illness  The patient presents for evaluation of an abnormal mammogram and vitamin D deficiency.    She reports experiencing pain in her right breast during the mammogram procedure. She has been informed that the radiologist will discuss the results with her post-procedure. She expresses concern about the possibility of a biopsy being recommended. She is scheduled for a follow-up mammogram on 07/17/2025.    She continues her phentermine regimen and reports a slight weight loss. She does not use alcohol or marijuana, although she admits to consuming a glass of wine during a recent dinner with her daughter.    SOCIAL HISTORY  She had a glass of wine with her daughter last night for her birthday dinner. She does not use marijuana.        No problems updated.    ROS  See HPI     Allergies[1]    Current medicines (including changes today)  Current Medications[2]    Social History[3]    Patient Active Problem List    Diagnosis Date Noted    Acute cystitis without hematuria 11/07/2022    Other constipation 11/07/2022    Class 1 obesity due to excess calories without serious comorbidity with body mass index (BMI) of 34.0 to 34.9 in adult 01/11/2021    Essential hypertension 01/11/2021    Bipolar depression (HCC) 01/11/2021       Family History   Problem Relation Age of Onset    Lung Disease Mother     Cancer Mother     Heart Disease Mother     Lung Disease Father     Cancer Father           Objective:     /88 (BP Location: Left arm, Patient Position: Sitting, BP Cuff Size: Adult)   Temp (!) 34.4 °C (94 °F) (Temporal)   Resp 16   Ht 1.575 m (5' 2\")   Wt 77.7 kg (171 lb 4.8 oz)   SpO2 98%  Body mass index is 31.33 kg/m².    Physical Exam:  Physical Exam  Vitals reviewed.   Constitutional:       " General: She is awake.      Appearance: Normal appearance. She is well-developed.   HENT:      Head: Normocephalic.   Eyes:      Conjunctiva/sclera: Conjunctivae normal.   Cardiovascular:      Rate and Rhythm: Normal rate.   Pulmonary:      Effort: Pulmonary effort is normal. No respiratory distress.   Musculoskeletal:      Cervical back: Neck supple.   Skin:     General: Skin is warm and dry.   Neurological:      Mental Status: She is alert and oriented to person, place, and time.   Psychiatric:         Mood and Affect: Mood normal.         Behavior: Behavior normal. Behavior is cooperative.              Assessment and Plan:     The following treatment plan was discussed:    Problem List Items Addressed This Visit    None  Visit Diagnoses         Vitamin D deficiency    -  Primary    Relevant Medications    ergocalciferol (DRISDOL) 99729 UNIT capsule            Assessment & Plan  1. Abnormal mammogram.  - The mammogram revealed a minor abnormality, necessitating further investigation.  - An ultrasound has been ordered for 07/17/2025 to evaluate the abnormality.  - The radiologist will discuss the results immediately after the ultrasound. If the ultrasound indicates a potential issue, a biopsy will be performed to determine the nature of the abnormality.  - The biopsy procedure, including the use of ultrasound guidance and local anesthesia, was explained. If the biopsy results indicate a benign fatty mass, no further action will be required other than monitoring. If the results suggest a more serious condition, appropriate treatment will be discussed.    2. Vitamin D deficiency.  - Her vitamin D levels are slightly below the normal range, which is common in this geographical location due to limited exposure to tropical UV sunlight.  - A prescription for high-dose vitamin D, to be taken once weekly for a duration of 3 months, has been provided.  - After this period, she can transition to an over-the-counter  supplement.  - The prescription will be sent to the pharmacy.    3. Weight management.  - She has lost 10 pounds since starting phentermine.  - Will continue her current regimen of phentermine.  - Follow-up appointment scheduled in 2 weeks.    Any change or worsening of signs or symptoms, patient encouraged to follow-up or report to emergency room for further evaluation. Patient verbalizes understanding and agrees.      PLEASE NOTE: This dictation was created using voice recognition software. I have made every reasonable attempt to correct obvious errors, but I expect that there are errors of grammar and possibly content that I did not discover before finalizing the note.       [1] No Known Allergies  [2]   Current Outpatient Medications   Medication Sig Dispense Refill    ergocalciferol (DRISDOL) 90632 UNIT capsule Take 1 Capsule by mouth every 7 days. 12 Capsule 0    phentermine (ADIPEX-P) 37.5 MG tablet Take 1 Tablet by mouth every morning before breakfast for 30 days. 30 Tablet 0     No current facility-administered medications for this visit.   [3]   Social History  Tobacco Use    Smoking status: Former     Current packs/day: 0.25     Types: Cigarettes    Smokeless tobacco: Never    Tobacco comments:     quit 01/2022   Vaping Use    Vaping status: Every Day    Substances: Nicotine    Devices: Disposable   Substance Use Topics    Alcohol use: Not Currently     Comment: ocass    Drug use: Yes     Types: Marijuana     Comment: somedays.

## 2025-07-02 ENCOUNTER — OFFICE VISIT (OUTPATIENT)
Dept: MEDICAL GROUP | Facility: MEDICAL CENTER | Age: 40
End: 2025-07-02
Attending: NURSE PRACTITIONER
Payer: COMMERCIAL

## 2025-07-02 ENCOUNTER — HOSPITAL ENCOUNTER (OUTPATIENT)
Facility: MEDICAL CENTER | Age: 40
End: 2025-07-02
Attending: NURSE PRACTITIONER
Payer: COMMERCIAL

## 2025-07-02 VITALS
TEMPERATURE: 96.6 F | OXYGEN SATURATION: 98 % | WEIGHT: 174.2 LBS | BODY MASS INDEX: 31.86 KG/M2 | DIASTOLIC BLOOD PRESSURE: 78 MMHG | HEART RATE: 57 BPM | SYSTOLIC BLOOD PRESSURE: 118 MMHG | RESPIRATION RATE: 16 BRPM

## 2025-07-02 DIAGNOSIS — L71.9 ROSACEA: ICD-10-CM

## 2025-07-02 DIAGNOSIS — N89.8 VAGINAL ITCHING: Primary | ICD-10-CM

## 2025-07-02 DIAGNOSIS — N89.8 VAGINAL ITCHING: ICD-10-CM

## 2025-07-02 PROCEDURE — 99213 OFFICE O/P EST LOW 20 MIN: CPT | Performed by: NURSE PRACTITIONER

## 2025-07-02 PROCEDURE — 87510 GARDNER VAG DNA DIR PROBE: CPT

## 2025-07-02 PROCEDURE — 87660 TRICHOMONAS VAGIN DIR PROBE: CPT

## 2025-07-02 PROCEDURE — 99214 OFFICE O/P EST MOD 30 MIN: CPT | Performed by: NURSE PRACTITIONER

## 2025-07-02 PROCEDURE — 87480 CANDIDA DNA DIR PROBE: CPT

## 2025-07-02 PROCEDURE — 3074F SYST BP LT 130 MM HG: CPT | Performed by: NURSE PRACTITIONER

## 2025-07-02 PROCEDURE — 3078F DIAST BP <80 MM HG: CPT | Performed by: NURSE PRACTITIONER

## 2025-07-02 RX ORDER — METRONIDAZOLE TOPICAL 7.5 MG/G
1 GEL TOPICAL 2 TIMES DAILY
Qty: 45 G | Refills: 1 | Status: SHIPPED | OUTPATIENT
Start: 2025-07-02

## 2025-07-02 ASSESSMENT — FIBROSIS 4 INDEX: FIB4 SCORE: 0.81

## 2025-07-03 ENCOUNTER — TELEPHONE (OUTPATIENT)
Dept: MEDICAL GROUP | Facility: MEDICAL CENTER | Age: 40
End: 2025-07-03
Payer: COMMERCIAL

## 2025-07-03 DIAGNOSIS — B96.89 BACTERIAL VAGINOSIS: Primary | ICD-10-CM

## 2025-07-03 DIAGNOSIS — N76.0 BACTERIAL VAGINOSIS: Primary | ICD-10-CM

## 2025-07-03 LAB
CANDIDA DNA VAG QL PROBE+SIG AMP: NEGATIVE
G VAGINALIS DNA VAG QL PROBE+SIG AMP: POSITIVE
T VAGINALIS DNA VAG QL PROBE+SIG AMP: NEGATIVE

## 2025-07-03 RX ORDER — METRONIDAZOLE 500 MG/1
500 TABLET ORAL 2 TIMES DAILY
Qty: 14 TABLET | Refills: 0 | Status: SHIPPED | OUTPATIENT
Start: 2025-07-03 | End: 2025-07-10

## 2025-07-03 NOTE — TELEPHONE ENCOUNTER
Call outcome: Spoke to patient regarding message below.    Message: Patient called regarding positive vaginal swab, she would like Rx sent to pharmacy ( [facility] on [address]). Alejandra is out of office may you assist please ?

## 2025-07-07 NOTE — PROGRESS NOTES
Verbal consent was acquired by the patient to use [employer] ambient listening note generation during this visit     Chief Complaint   Patient presents with    Medication Refill    Vaginal Itching       Subjective:     HPI:   History of Present Illness  The patient presents for evaluation of vaginal itching and facial redness.    She reports a slight improvement in stress levels, although she remains somewhat anxious about an upcoming event on 07/17/2025. Her weight has remained stable, and she is adhering to her dietary plan.    She has been experiencing mild itching and recently noticed a discharge with an accompanying odor during personal hygiene. A swab test was conducted to investigate this issue.    She has had persistent facial redness since childhood, which she believes has intensified with age. Her daughter suggested that it might be rosacea. She currently uses Dove soap for skincare, as any changes to her routine result in increased redness and breakouts.        No problems updated.    ROS  See HPI     Allergies[1]    Current medicines (including changes today)  Current Medications[2]    Social History[3]    Patient Active Problem List    Diagnosis Date Noted    Acute cystitis without hematuria 11/07/2022    Other constipation 11/07/2022    Class 1 obesity due to excess calories without serious comorbidity with body mass index (BMI) of 34.0 to 34.9 in adult 01/11/2021    Essential hypertension 01/11/2021    Bipolar depression (HCC) 01/11/2021       Family History   Problem Relation Age of Onset    Lung Disease Mother     Cancer Mother     Heart Disease Mother     Lung Disease Father     Cancer Father           Objective:     /78 (BP Location: Left arm, Patient Position: Sitting, BP Cuff Size: Adult)   Pulse (!) 57   Temp 35.9 °C (96.6 °F) (Temporal)   Resp 16   Wt 79 kg (174 lb 3.2 oz)   SpO2 98%  Body mass index is 31.86 kg/m².    Physical Exam:  Physical Exam  Vitals reviewed.    Constitutional:       General: She is awake.      Appearance: Normal appearance. She is well-developed.   HENT:      Head: Normocephalic.   Eyes:      Conjunctiva/sclera: Conjunctivae normal.   Cardiovascular:      Rate and Rhythm: Normal rate.   Pulmonary:      Effort: Pulmonary effort is normal. No respiratory distress.   Musculoskeletal:      Cervical back: Neck supple.   Skin:     General: Skin is warm and dry.   Neurological:      Mental Status: She is alert and oriented to person, place, and time.   Psychiatric:         Mood and Affect: Mood normal.         Behavior: Behavior normal. Behavior is cooperative.              Assessment and Plan:     The following treatment plan was discussed:    Problem List Items Addressed This Visit    None  Visit Diagnoses         Vaginal itching    -  Primary    Relevant Orders    VAGINAL PATHOGENS DNA PANEL (Completed)      Rosacea        Relevant Medications    metronidazole (METROGEL) 0.75 % gel            Assessment & Plan  1. Vaginal itching.  - Reported noticing discharge and a slight odor.  - Swab taken; results expected in a day or so.  - Advised to send a [employer] message as soon as results are available.  - If positive, a provider will prescribe appropriate medication to avoid discomfort over the holiday weekend.    2. Facial redness.  - Symptoms suggest possible rosacea.  - Prescription for MetroGel face wash provided, to be used twice daily.  - Advised to continue using Dove soap in the morning and apply MetroGel afterward; minimize sun exposure.  - Effectiveness of treatment to be evaluated after 1 to 2 weeks; if no improvement, referral to dermatology will be considered.    Any change or worsening of signs or symptoms, patient encouraged to follow-up or report to emergency room for further evaluation. Patient verbalizes understanding and agrees.      PLEASE NOTE: This dictation was created using voice recognition software. I have made every reasonable attempt to  correct obvious errors, but I expect that there are errors of grammar and possibly content that I did not discover before finalizing the note.       [1] No Known Allergies  [2]   Current Outpatient Medications   Medication Sig Dispense Refill    metronidazole (METROGEL) 0.75 % gel Apply 1 Application topically 2 times a day. 45 g 1    ergocalciferol (DRISDOL) 30091 UNIT capsule Take 1 Capsule by mouth every 7 days. 12 Capsule 0    metroNIDAZOLE (FLAGYL) 500 MG Tab Take 1 Tablet by mouth 2 times a day for 7 days. 14 Tablet 0     No current facility-administered medications for this visit.   [3]   Social History  Tobacco Use    Smoking status: Former     Current packs/day: 0.25     Types: Cigarettes    Smokeless tobacco: Never    Tobacco comments:     quit 01/2022   Vaping Use    Vaping status: Every Day    Substances: Nicotine    Devices: Disposable   Substance Use Topics    Alcohol use: Not Currently     Comment: ocass    Drug use: Yes     Types: Marijuana     Comment: somedays.

## 2025-07-17 ENCOUNTER — HOSPITAL ENCOUNTER (OUTPATIENT)
Facility: MEDICAL CENTER | Age: 40
End: 2025-07-17
Attending: NURSE PRACTITIONER
Payer: COMMERCIAL

## 2025-07-17 DIAGNOSIS — N63.41 SUBAREOLAR MASS OF RIGHT BREAST: ICD-10-CM

## 2025-07-17 PROCEDURE — 76642 ULTRASOUND BREAST LIMITED: CPT | Mod: RT

## 2025-08-12 DIAGNOSIS — Z00.6 CLINICAL TRIAL PARTICIPANT: ICD-10-CM

## 2025-08-13 ENCOUNTER — HOSPITAL ENCOUNTER (OUTPATIENT)
Facility: MEDICAL CENTER | Age: 40
End: 2025-08-13
Attending: NURSE PRACTITIONER
Payer: COMMERCIAL

## 2025-08-13 DIAGNOSIS — R92.8 ABNORMAL FINDINGS ON DIAGNOSTIC IMAGING OF BREAST: ICD-10-CM

## 2025-08-13 LAB — PATHOLOGY CONSULT NOTE: NORMAL

## 2025-08-13 PROCEDURE — 19083 BX BREAST 1ST LESION US IMAG: CPT | Mod: RT

## 2025-08-13 PROCEDURE — 77065 DX MAMMO INCL CAD UNI: CPT

## 2025-08-13 PROCEDURE — 88305 TISSUE EXAM BY PATHOLOGIST: CPT | Performed by: PATHOLOGY

## 2025-08-13 PROCEDURE — 88305 TISSUE EXAM BY PATHOLOGIST: CPT | Mod: 26 | Performed by: PATHOLOGY

## 2025-08-18 ENCOUNTER — RESULTS FOLLOW-UP (OUTPATIENT)
Dept: MEDICAL GROUP | Facility: MEDICAL CENTER | Age: 40
End: 2025-08-18
Payer: COMMERCIAL

## 2025-08-18 DIAGNOSIS — N63.41 SUBAREOLAR MASS OF RIGHT BREAST: Primary | ICD-10-CM

## 2025-08-25 DIAGNOSIS — L71.9 ROSACEA: ICD-10-CM

## 2025-08-27 RX ORDER — METRONIDAZOLE TOPICAL 7.5 MG/G
GEL TOPICAL
Qty: 45 G | Refills: 0 | Status: SHIPPED | OUTPATIENT
Start: 2025-08-27